# Patient Record
Sex: MALE | Race: WHITE | Employment: UNEMPLOYED | URBAN - METROPOLITAN AREA
[De-identification: names, ages, dates, MRNs, and addresses within clinical notes are randomized per-mention and may not be internally consistent; named-entity substitution may affect disease eponyms.]

---

## 2020-03-24 ENCOUNTER — HOSPITAL ENCOUNTER (INPATIENT)
Facility: CLINIC | Age: 55
LOS: 5 days | Discharge: HOME | End: 2020-03-30
Attending: EMERGENCY MEDICINE | Admitting: INTERNAL MEDICINE
Payer: MEDICAID

## 2020-03-24 DIAGNOSIS — J30.2 SEASONAL ALLERGIC RHINITIS, UNSPECIFIED TRIGGER: Primary | ICD-10-CM

## 2020-03-24 DIAGNOSIS — F10.930 ALCOHOL WITHDRAWAL SYNDROME WITHOUT COMPLICATION (H): ICD-10-CM

## 2020-03-24 LAB
BASOPHILS # BLD AUTO: 0 10E9/L (ref 0–0.2)
BASOPHILS NFR BLD AUTO: 0.3 %
DIFFERENTIAL METHOD BLD: ABNORMAL
EOSINOPHIL # BLD AUTO: 0.1 10E9/L (ref 0–0.7)
EOSINOPHIL NFR BLD AUTO: 1.2 %
ERYTHROCYTE [DISTWIDTH] IN BLOOD BY AUTOMATED COUNT: 17.3 % (ref 10–15)
HCT VFR BLD AUTO: 45.9 % (ref 40–53)
HGB BLD-MCNC: 17.2 G/DL (ref 13.3–17.7)
IMM GRANULOCYTES # BLD: 0 10E9/L (ref 0–0.4)
IMM GRANULOCYTES NFR BLD: 0.1 %
LYMPHOCYTES # BLD AUTO: 1.2 10E9/L (ref 0.8–5.3)
LYMPHOCYTES NFR BLD AUTO: 17.2 %
MCH RBC QN AUTO: 35 PG (ref 26.5–33)
MCHC RBC AUTO-ENTMCNC: 37.5 G/DL (ref 31.5–36.5)
MCV RBC AUTO: 93 FL (ref 78–100)
MONOCYTES # BLD AUTO: 0.7 10E9/L (ref 0–1.3)
MONOCYTES NFR BLD AUTO: 10.6 %
NEUTROPHILS # BLD AUTO: 4.9 10E9/L (ref 1.6–8.3)
NEUTROPHILS NFR BLD AUTO: 70.6 %
NRBC # BLD AUTO: 0 10*3/UL
NRBC BLD AUTO-RTO: 0 /100
PLATELET # BLD AUTO: 102 10E9/L (ref 150–450)
RBC # BLD AUTO: 4.92 10E12/L (ref 4.4–5.9)
WBC # BLD AUTO: 6.9 10E9/L (ref 4–11)

## 2020-03-24 PROCEDURE — 25800030 ZZH RX IP 258 OP 636: Performed by: EMERGENCY MEDICINE

## 2020-03-24 PROCEDURE — 83735 ASSAY OF MAGNESIUM: CPT | Performed by: EMERGENCY MEDICINE

## 2020-03-24 PROCEDURE — 96361 HYDRATE IV INFUSION ADD-ON: CPT

## 2020-03-24 PROCEDURE — 85025 COMPLETE CBC W/AUTO DIFF WBC: CPT | Performed by: EMERGENCY MEDICINE

## 2020-03-24 PROCEDURE — 80048 BASIC METABOLIC PNL TOTAL CA: CPT | Performed by: EMERGENCY MEDICINE

## 2020-03-24 PROCEDURE — 96374 THER/PROPH/DIAG INJ IV PUSH: CPT

## 2020-03-24 PROCEDURE — 25000128 H RX IP 250 OP 636: Performed by: EMERGENCY MEDICINE

## 2020-03-24 PROCEDURE — 84100 ASSAY OF PHOSPHORUS: CPT | Performed by: EMERGENCY MEDICINE

## 2020-03-24 PROCEDURE — 99285 EMERGENCY DEPT VISIT HI MDM: CPT

## 2020-03-24 RX ORDER — LORAZEPAM 2 MG/ML
2 INJECTION INTRAMUSCULAR ONCE
Status: COMPLETED | OUTPATIENT
Start: 2020-03-24 | End: 2020-03-24

## 2020-03-24 RX ADMIN — SODIUM CHLORIDE 1000 ML: 9 INJECTION, SOLUTION INTRAVENOUS at 23:49

## 2020-03-24 RX ADMIN — LORAZEPAM 2 MG: 2 INJECTION INTRAMUSCULAR; INTRAVENOUS at 23:51

## 2020-03-24 ASSESSMENT — ENCOUNTER SYMPTOMS
TREMORS: 1
HALLUCINATIONS: 1

## 2020-03-25 PROBLEM — F10.931 DELIRIUM TREMENS (H): Status: ACTIVE | Noted: 2020-03-25

## 2020-03-25 LAB
ANION GAP SERPL CALCULATED.3IONS-SCNC: 9 MMOL/L (ref 3–14)
BUN SERPL-MCNC: 10 MG/DL (ref 7–30)
CALCIUM SERPL-MCNC: 9.2 MG/DL (ref 8.5–10.1)
CHLORIDE SERPL-SCNC: 104 MMOL/L (ref 94–109)
CO2 SERPL-SCNC: 21 MMOL/L (ref 20–32)
CREAT SERPL-MCNC: 0.9 MG/DL (ref 0.66–1.25)
GFR SERPL CREATININE-BSD FRML MDRD: >90 ML/MIN/{1.73_M2}
GLUCOSE SERPL-MCNC: 144 MG/DL (ref 70–99)
LACTATE BLD-SCNC: 0.7 MMOL/L (ref 0.7–2)
MAGNESIUM SERPL-MCNC: 1.3 MG/DL (ref 1.6–2.3)
MAGNESIUM SERPL-MCNC: 2.2 MG/DL (ref 1.6–2.3)
PHOSPHATE SERPL-MCNC: 4.3 MG/DL (ref 2.5–4.5)
POTASSIUM SERPL-SCNC: 3.4 MMOL/L (ref 3.4–5.3)
POTASSIUM SERPL-SCNC: 3.5 MMOL/L (ref 3.4–5.3)
SODIUM SERPL-SCNC: 134 MMOL/L (ref 133–144)

## 2020-03-25 PROCEDURE — HZ2ZZZZ DETOXIFICATION SERVICES FOR SUBSTANCE ABUSE TREATMENT: ICD-10-PCS | Performed by: INTERNAL MEDICINE

## 2020-03-25 PROCEDURE — 25000128 H RX IP 250 OP 636: Performed by: INTERNAL MEDICINE

## 2020-03-25 PROCEDURE — 25000125 ZZHC RX 250: Performed by: EMERGENCY MEDICINE

## 2020-03-25 PROCEDURE — 25000132 ZZH RX MED GY IP 250 OP 250 PS 637: Performed by: HOSPITALIST

## 2020-03-25 PROCEDURE — 96366 THER/PROPH/DIAG IV INF ADDON: CPT

## 2020-03-25 PROCEDURE — 96376 TX/PRO/DX INJ SAME DRUG ADON: CPT

## 2020-03-25 PROCEDURE — 25000128 H RX IP 250 OP 636: Performed by: EMERGENCY MEDICINE

## 2020-03-25 PROCEDURE — 12000000 ZZH R&B MED SURG/OB

## 2020-03-25 PROCEDURE — 84132 ASSAY OF SERUM POTASSIUM: CPT | Performed by: INTERNAL MEDICINE

## 2020-03-25 PROCEDURE — 99223 1ST HOSP IP/OBS HIGH 75: CPT | Mod: AI | Performed by: INTERNAL MEDICINE

## 2020-03-25 PROCEDURE — 25800030 ZZH RX IP 258 OP 636: Performed by: EMERGENCY MEDICINE

## 2020-03-25 PROCEDURE — 83605 ASSAY OF LACTIC ACID: CPT | Performed by: HOSPITALIST

## 2020-03-25 PROCEDURE — 25000132 ZZH RX MED GY IP 250 OP 250 PS 637: Performed by: INTERNAL MEDICINE

## 2020-03-25 PROCEDURE — 36415 COLL VENOUS BLD VENIPUNCTURE: CPT | Performed by: INTERNAL MEDICINE

## 2020-03-25 PROCEDURE — 99207 ZZC NON-BILLABLE SERV PER CHARTING: CPT | Performed by: HOSPITALIST

## 2020-03-25 PROCEDURE — 83735 ASSAY OF MAGNESIUM: CPT | Performed by: INTERNAL MEDICINE

## 2020-03-25 RX ORDER — TETRAHYDROZOLINE HCL 0.05 %
1 DROPS OPHTHALMIC (EYE) 2 TIMES DAILY PRN
COMMUNITY

## 2020-03-25 RX ORDER — LANOLIN ALCOHOL/MO/W.PET/CERES
100 CREAM (GRAM) TOPICAL DAILY
Status: DISCONTINUED | OUTPATIENT
Start: 2020-03-26 | End: 2020-03-25

## 2020-03-25 RX ORDER — MULTIPLE VITAMINS W/ MINERALS TAB 9MG-400MCG
1 TAB ORAL DAILY
Status: DISCONTINUED | OUTPATIENT
Start: 2020-03-26 | End: 2020-03-25

## 2020-03-25 RX ORDER — LIDOCAINE 40 MG/G
CREAM TOPICAL
Status: DISCONTINUED | OUTPATIENT
Start: 2020-03-25 | End: 2020-03-30 | Stop reason: HOSPADM

## 2020-03-25 RX ORDER — AMOXICILLIN 250 MG
2 CAPSULE ORAL 2 TIMES DAILY PRN
Status: DISCONTINUED | OUTPATIENT
Start: 2020-03-25 | End: 2020-03-30 | Stop reason: HOSPADM

## 2020-03-25 RX ORDER — LANOLIN ALCOHOL/MO/W.PET/CERES
100 CREAM (GRAM) TOPICAL DAILY
Status: COMPLETED | OUTPATIENT
Start: 2020-03-25 | End: 2020-03-28

## 2020-03-25 RX ORDER — ACETAMINOPHEN 500 MG
1000 TABLET ORAL EVERY 4 HOURS PRN
COMMUNITY

## 2020-03-25 RX ORDER — NALOXONE HYDROCHLORIDE 0.4 MG/ML
.1-.4 INJECTION, SOLUTION INTRAMUSCULAR; INTRAVENOUS; SUBCUTANEOUS
Status: DISCONTINUED | OUTPATIENT
Start: 2020-03-25 | End: 2020-03-30 | Stop reason: HOSPADM

## 2020-03-25 RX ORDER — MAGNESIUM SULFATE HEPTAHYDRATE 40 MG/ML
2 INJECTION, SOLUTION INTRAVENOUS DAILY PRN
Status: DISCONTINUED | OUTPATIENT
Start: 2020-03-25 | End: 2020-03-30 | Stop reason: HOSPADM

## 2020-03-25 RX ORDER — ONDANSETRON 4 MG/1
4 TABLET, ORALLY DISINTEGRATING ORAL EVERY 6 HOURS PRN
Status: DISCONTINUED | OUTPATIENT
Start: 2020-03-25 | End: 2020-03-30 | Stop reason: HOSPADM

## 2020-03-25 RX ORDER — METOPROLOL TARTRATE 50 MG
50 TABLET ORAL 2 TIMES DAILY
Status: DISCONTINUED | OUTPATIENT
Start: 2020-03-25 | End: 2020-03-30 | Stop reason: HOSPADM

## 2020-03-25 RX ORDER — MULTIPLE VITAMINS W/ MINERALS TAB 9MG-400MCG
1 TAB ORAL DAILY
Status: DISCONTINUED | OUTPATIENT
Start: 2020-03-25 | End: 2020-03-30 | Stop reason: HOSPADM

## 2020-03-25 RX ORDER — METOPROLOL TARTRATE 50 MG
50 TABLET ORAL 2 TIMES DAILY
COMMUNITY

## 2020-03-25 RX ORDER — HALOPERIDOL 5 MG/ML
2 INJECTION INTRAMUSCULAR EVERY 6 HOURS PRN
Status: DISCONTINUED | OUTPATIENT
Start: 2020-03-25 | End: 2020-03-30 | Stop reason: HOSPADM

## 2020-03-25 RX ORDER — FOLIC ACID 1 MG/1
1 TABLET ORAL DAILY
Status: DISCONTINUED | OUTPATIENT
Start: 2020-03-26 | End: 2020-03-25

## 2020-03-25 RX ORDER — DIAZEPAM 10 MG/2ML
5-10 INJECTION, SOLUTION INTRAMUSCULAR; INTRAVENOUS EVERY 30 MIN PRN
Status: DISCONTINUED | OUTPATIENT
Start: 2020-03-25 | End: 2020-03-30 | Stop reason: HOSPADM

## 2020-03-25 RX ORDER — QUETIAPINE FUMARATE 25 MG/1
25-50 TABLET, FILM COATED ORAL EVERY 6 HOURS PRN
Status: DISCONTINUED | OUTPATIENT
Start: 2020-03-25 | End: 2020-03-30 | Stop reason: HOSPADM

## 2020-03-25 RX ORDER — POTASSIUM CHLORIDE 1500 MG/1
20-40 TABLET, EXTENDED RELEASE ORAL
Status: DISCONTINUED | OUTPATIENT
Start: 2020-03-25 | End: 2020-03-30 | Stop reason: HOSPADM

## 2020-03-25 RX ORDER — HALOPERIDOL 0.5 MG/1
2 TABLET ORAL EVERY 6 HOURS PRN
Status: DISCONTINUED | OUTPATIENT
Start: 2020-03-25 | End: 2020-03-25

## 2020-03-25 RX ORDER — DIAZEPAM 5 MG
10 TABLET ORAL EVERY 30 MIN PRN
Status: DISCONTINUED | OUTPATIENT
Start: 2020-03-25 | End: 2020-03-30 | Stop reason: HOSPADM

## 2020-03-25 RX ORDER — POLYETHYLENE GLYCOL 3350 17 G/17G
17 POWDER, FOR SOLUTION ORAL DAILY PRN
Status: DISCONTINUED | OUTPATIENT
Start: 2020-03-25 | End: 2020-03-30 | Stop reason: HOSPADM

## 2020-03-25 RX ORDER — POTASSIUM CHLORIDE 7.45 MG/ML
10 INJECTION INTRAVENOUS
Status: DISCONTINUED | OUTPATIENT
Start: 2020-03-25 | End: 2020-03-30 | Stop reason: HOSPADM

## 2020-03-25 RX ORDER — ACETAMINOPHEN 325 MG/1
650 TABLET ORAL EVERY 4 HOURS PRN
Status: DISCONTINUED | OUTPATIENT
Start: 2020-03-25 | End: 2020-03-30 | Stop reason: HOSPADM

## 2020-03-25 RX ORDER — AMOXICILLIN 250 MG
1 CAPSULE ORAL 2 TIMES DAILY PRN
Status: DISCONTINUED | OUTPATIENT
Start: 2020-03-25 | End: 2020-03-30 | Stop reason: HOSPADM

## 2020-03-25 RX ORDER — BISACODYL 10 MG
10 SUPPOSITORY, RECTAL RECTAL DAILY PRN
Status: DISCONTINUED | OUTPATIENT
Start: 2020-03-25 | End: 2020-03-30 | Stop reason: HOSPADM

## 2020-03-25 RX ORDER — ONDANSETRON 2 MG/ML
4 INJECTION INTRAMUSCULAR; INTRAVENOUS EVERY 6 HOURS PRN
Status: DISCONTINUED | OUTPATIENT
Start: 2020-03-25 | End: 2020-03-30 | Stop reason: HOSPADM

## 2020-03-25 RX ORDER — POTASSIUM CHLORIDE 1.5 G/1.58G
20-40 POWDER, FOR SOLUTION ORAL
Status: DISCONTINUED | OUTPATIENT
Start: 2020-03-25 | End: 2020-03-30 | Stop reason: HOSPADM

## 2020-03-25 RX ORDER — FOLIC ACID 1 MG/1
1 TABLET ORAL DAILY
Status: DISCONTINUED | OUTPATIENT
Start: 2020-03-25 | End: 2020-03-30 | Stop reason: HOSPADM

## 2020-03-25 RX ORDER — MAGNESIUM SULFATE HEPTAHYDRATE 40 MG/ML
4 INJECTION, SOLUTION INTRAVENOUS EVERY 4 HOURS PRN
Status: DISCONTINUED | OUTPATIENT
Start: 2020-03-25 | End: 2020-03-30 | Stop reason: HOSPADM

## 2020-03-25 RX ORDER — LORAZEPAM 2 MG/ML
4 INJECTION INTRAMUSCULAR ONCE
Status: COMPLETED | OUTPATIENT
Start: 2020-03-25 | End: 2020-03-25

## 2020-03-25 RX ORDER — POTASSIUM CHLORIDE 29.8 MG/ML
20 INJECTION INTRAVENOUS
Status: DISCONTINUED | OUTPATIENT
Start: 2020-03-25 | End: 2020-03-30 | Stop reason: HOSPADM

## 2020-03-25 RX ORDER — POTASSIUM CL/LIDO/0.9 % NACL 10MEQ/0.1L
10 INTRAVENOUS SOLUTION, PIGGYBACK (ML) INTRAVENOUS
Status: DISCONTINUED | OUTPATIENT
Start: 2020-03-25 | End: 2020-03-30 | Stop reason: HOSPADM

## 2020-03-25 RX ORDER — HALOPERIDOL 2 MG/1
2 TABLET ORAL EVERY 6 HOURS PRN
Status: DISCONTINUED | OUTPATIENT
Start: 2020-03-25 | End: 2020-03-30 | Stop reason: HOSPADM

## 2020-03-25 RX ADMIN — DIAZEPAM 10 MG: 5 TABLET ORAL at 17:20

## 2020-03-25 RX ADMIN — POTASSIUM CHLORIDE 20 MEQ: 1500 TABLET, EXTENDED RELEASE ORAL at 03:13

## 2020-03-25 RX ADMIN — DIAZEPAM 10 MG: 5 TABLET ORAL at 15:20

## 2020-03-25 RX ADMIN — DIAZEPAM 10 MG: 5 TABLET ORAL at 18:03

## 2020-03-25 RX ADMIN — Medication 100 MG: at 09:21

## 2020-03-25 RX ADMIN — DIAZEPAM 10 MG: 5 TABLET ORAL at 20:02

## 2020-03-25 RX ADMIN — FOLIC ACID 1 MG: 1 TABLET ORAL at 09:21

## 2020-03-25 RX ADMIN — POTASSIUM CHLORIDE 20 MEQ: 1500 TABLET, EXTENDED RELEASE ORAL at 10:04

## 2020-03-25 RX ADMIN — FOLIC ACID: 5 INJECTION, SOLUTION INTRAMUSCULAR; INTRAVENOUS; SUBCUTANEOUS at 00:39

## 2020-03-25 RX ADMIN — MULTIPLE VITAMINS W/ MINERALS TAB 1 TABLET: TAB at 09:21

## 2020-03-25 RX ADMIN — LORAZEPAM 4 MG: 2 INJECTION INTRAMUSCULAR; INTRAVENOUS at 00:39

## 2020-03-25 RX ADMIN — ACETAMINOPHEN 650 MG: 325 TABLET, FILM COATED ORAL at 15:41

## 2020-03-25 RX ADMIN — METOPROLOL TARTRATE 50 MG: 50 TABLET, FILM COATED ORAL at 12:18

## 2020-03-25 RX ADMIN — METOPROLOL TARTRATE 50 MG: 50 TABLET, FILM COATED ORAL at 20:02

## 2020-03-25 RX ADMIN — MAGNESIUM SULFATE IN WATER 4 G: 40 INJECTION, SOLUTION INTRAVENOUS at 04:19

## 2020-03-25 ASSESSMENT — ACTIVITIES OF DAILY LIVING (ADL)
ADLS_ACUITY_SCORE: 11
ADLS_ACUITY_SCORE: 11
ADLS_ACUITY_SCORE: 14

## 2020-03-25 NOTE — ED NOTES
"[facility]  ED Nurse Handoff Report    ED Chief complaint: Withdrawal (Last drink 0530 this AM.)      ED Diagnosis:   Final diagnoses:   Alcohol withdrawal syndrome without complication (H)       Code Status: Full Code    Allergies:   Allergies   Allergen Reactions     Penicillins        Patient Story: Pt presents with alcohol withdrawal. No hx of alcohol withdrawal seizure history. Last drink at 0530 today.  Focused Assessment:  Pt very tremulous. Having auditory and visual hallucinations. Visibly sweating. Reports he has a Rule 25 meeting on Thursday in order to get in for detox. Initially tachy in the 110-120s.    Treatments and/or interventions provided: 6mg zofran  Patient's response to treatments and/or interventions: Improvement in tremors and hallucinations and no longer tachycardic or sweating.    To be done/followed up on inpatient unit:  none pending    Does this patient have any cognitive concerns?: none    Activity level - Baseline/Home:  Independent  Activity Level - Current:   Independent    Patient's Preferred language: Data Unavailable   Needed?: No    Isolation: None  Infection: Not Applicable  Bariatric?: No    Vital Signs:   Vitals:    03/24/20 2341   BP: 133/72   Resp: 18   Temp: 98.7  F (37.1  C)   TempSrc: Oral   SpO2: 96%   Height: 1.778 m (5' 10\")       Cardiac Rhythm:     Was the PSS-3 completed:   Yes  What interventions are required if any?               Family Comments: None present  OBS brochure/video discussed/provided to patient/family: N/A              Name of person given brochure if not patient: N/A              Relationship to patient: N/A    For the majority of the shift this patient's behavior was Yellow.   Behavioral interventions performed were reassurance and information.    ED NURSE PHONE NUMBER: [phone]       "

## 2020-03-25 NOTE — PLAN OF CARE
A/ox4, anxious. C/o headache, prn tylenol given. VSS, ex hypertensive w/in parameters. CIWA score 10-auditory hallucinations, c/o someone saying his name in hallway causing pt to be very anxious. Pt also having headache and tremors. Diazepam given per orders. On reassessment, pt scored a 17 on CIWA scale for visual + auditory hallucinations-hearing dogs in hallway and someone saying his name, people dancing on a balconies across the street, plus headache, agitation, anxiety, tremors. Diazepam given per orders. On reassessment, score 10 on CIWA scale for anxiety, tremors, visual hallucinations. Diazepam given per orders. Up with SBA. Reg diet. Seizure precautions in place, no sz activity noted/reported. Plan for K+ recheck in AM. Discharge pending progress.

## 2020-03-25 NOTE — PROGRESS NOTES
RECEIVING UNIT ED HANDOFF REVIEW    ED Nurse Handoff Report was reviewed by: Ana Ramesh RN on March 25, 2020 at 1:16 AM

## 2020-03-25 NOTE — CONSULTS
3/25/20 chem dep consult acknowledged.  Patient has Medicaid so would need Rule 25 funding from [facility] for any substance use services.  I emailed unit  for [facility] and requested she provide to patient to complete and once completed return to me.  Will follow up once this is received.

## 2020-03-25 NOTE — PROGRESS NOTES
[facility]    Medicine Progress Note - Hospitalist Service       Date of Admission:  3/24/2020  Assessment & Plan   Leonel Ford is a very pleasant 54 year old male with alcohol abuse who was admitted on 3/24/2020 for delirium tremens and hallucinations associated with alcohol withdrawal.      Delirium Tremens with hallucinations   Alcohol Withdrawal  Alcohol use disorder  This is his 6th known ED/hospital presentation for alcohol intoxication or withdrawal within 4 months. He denies ever having alcohol treatment program enrollment in the past. He is willing to consider it now.   - Seizure precautions.  - CIWA with prn diazepam, prn IV/po haldol for hallucinations/agitation, prn seroquel.  - Continue thiamine, folate, MVI.  - Replace potassium, magnesium, phosphorus as needed.  - CD consultation pending.     Chronic thrombocytopenia  Liver cirrhosis   Hepatosplenomegaly  Secondary to longstanding alcohol abuse.  - Support abstinence or reduction in use as noted above.     Hypertension  Reports he takes a heart pill twice daily. Current medicine list indicates no PTA meds but chart review suggests he was started on metoprolol at a prior admission.  - Resume PTA metoprolol.        Diet: Combination Diet Regular Diet Adult    DVT Prophylaxis: Pneumatic Compression Devices  Puentes Catheter: not present  Code Status: Full Code      Disposition Plan   Expected discharge: 2 - 3 days.  Entered: Renny Alvarado MD 03/25/2020, 10:44 AM       The patient's care was discussed with the Bedside Nurse and Patient.    Renny Alvarado MD  Hospitalist Service  [facility]    ______________________________________________________________________    Interval History   Leonel Ford was seen this morning. Admitted overnight due to alcohol withdrawal. No new symptoms or concerns since admission.    Data reviewed today: I reviewed all medications, new labs and imaging results over the last 24 hours.  I personally reviewed no images or EKG's today.    Physical Exam   Vital Signs: Temp: 97.9  F (36.6  C) Temp src: Axillary BP: (!) 153/102 Pulse: 92 Heart Rate: 103 Resp: 28 SpO2: 96 % O2 Device: None (Room air)    Weight: 0 lbs 0 oz  Constitutional: awake, alert, cooperative, no apparent distress  Respiratory: clear to auscultation bilaterally, no crackles or wheezing  Cardiovascular: regular rate and rhythm, normal S1 and S2, no murmur noted  GI: normal bowel sounds, soft, obese, non-distended, non-tender  Skin: warm, dry  Musculoskeletal: no lower extremity pitting edema present  Neurologic: awake, alert, oriented to name, place and time, mild tremor    Data   Recent Labs   Lab 03/25/20  0918 03/24/20  2347   WBC  --  6.9   HGB  --  17.2   MCV  --  93   PLT  --  102*   NA  --  134   POTASSIUM 3.5 3.4   CHLORIDE  --  104   CO2  --  21   BUN  --  10   CR  --  0.90   ANIONGAP  --  9   ROXY  --  9.2   GLC  --  144*     No results found for this or any previous visit (from the past 24 hour(s)).  Medications       folic acid  1 mg Oral Daily     metoprolol tartrate  50 mg Oral BID     multivitamin w/minerals  1 tablet Oral Daily     sodium chloride (PF)  3 mL Intracatheter Q8H     vitamin B1  100 mg Oral Daily

## 2020-03-25 NOTE — PLAN OF CARE
A&Ox4. VSS on RA. CIWA scale scored a 5 and 3. Lung sounds clear, BS present. Denies pain. Tele SR with BBB. Seizure precaution in place. Abnormal labs Phos 4.3, Mag 1.3 Replaced, K+ 3.4 replaced. PIV. Has a CD consult.  Discharge to be determined.

## 2020-03-25 NOTE — ED PROVIDER NOTES
"  History   Chief Complaint:  Withdrawal      HPI   Leonel Ford is a 54 year old male with history of hypertension who presents for evaluation of alcohol withdrawal. The patient states for the past 3 weeks he has been drinking four 1.75 L of whiskey per week. This morning between 0530 and 0600 he had his last drink and began to develop tremors. He noted his symptoms were similar in presentation to her previous alcohol withdrawal, prompting his presentation.    Here, the patient states he does not have a history of alcohol withdrawal seizures. He also endorses visual hallucinations. He notes he has been to detox once in the past. He denies any other symptoms prompting his presentation.     Allergies:  Penicillins    Medications:    The patient is not currently taking any prescribed medications.     Past Medical History:    Hypertension    Past Surgical History:    History reviewed. No pertinent past surgical history.     Family History:    The patient denies any relevant family medical history.     Social History:  The patient was unaccompanied to the ED.  Smoking Status: Never  Smokeless Tobacco: Never  Alcohol Use: Yes  Drug Use: No       Review of Systems   Neurological: Positive for tremors.   Psychiatric/Behavioral: Positive for hallucinations.   All other systems reviewed and are negative.    Physical Exam     Patient Vitals for the past 24 hrs:   BP Temp Temp src Pulse Heart Rate Resp SpO2 Height   03/25/20 0100 (!) 138/99 -- -- 92 -- -- 95 % --   03/25/20 0001 (!) 158/109 -- -- 105 -- -- 96 % --   03/24/20 2353 (!) 162/101 -- -- 109 -- -- 96 % --   03/24/20 2341 133/72 98.7  F (37.1  C) Oral -- 110 18 96 % 1.778 m (5' 10\")     Physical Exam  Vitals: reviewed by me  General: Pt seen on [facility], pleasant, cooperative, and alert to conversation, mildly ill-appearing, diaphoretic.  Eyes: Tracking well, clear conjunctiva BL  ENT: MMM, midline trachea.   Lymph: No lymphadenopathy or masses noted  Lungs:   " No tachypnea, no accessory muscle use. No respiratory distress.   CV: Rate as above, regular rhythm.    MSK: no peripheral edema or joint effusion.  No evidence of trauma  Skin: No rash, normal turgor and temperature  Neuro: Clear speech and no facial droop.  Tremulous in all extremities, somewhat anxious appearing.  Psych: Not RIS, no e/o AH/VH denies suicidality.    Emergency Department Course   Laboratory:  Laboratory findings were communicated with the patient and Admitting MD who voiced understanding of the findings.    CBC:  (L) o/w WNL (WBC 6.9, HGB 17.2)   BMP: Glucose 144 (H) o/w WNL (Creatinine 0.90)  Magnesium: 1.3 (L)     Interventions:  2349 0.9% NaCl bolus 1000 mL IV   2351 Ativan 2 mg IV  0039 Ativan 4 mg IV  0039 Sodium chloride 0.9% 1000 mL with Infuvite adult 10 mL, thiamine 100 mg, folic acid 1 mg infusion IV    Emergency Department Course:  Past medical records, nursing notes, and vitals reviewed.  IV was inserted and blood was drawn for laboratory testing, results above.     (2337)   I performed an exam of the patient as documented above. History obtained from patient.     (0010)   I rechecked the patient.    (0010)   Hospitalist paged.      (0020)   I spoke with Dr. Vasques of the Hospitalist service regarding patient's presentation, findings, and plan of care.     Findings and plan explained to the Patient who consents to admission. Discussed the patient with Dr. Vasques, who will admit the patient to a SUDS bed for further monitoring, evaluation, and treatment. I personally reviewed the laboratory results with the Patient and answered all related questions prior to admission.     Impression & Plan   Medical Decision Making:  Leonel Ford is a very pleasant 54 year old male who presents to the emergency room for what appears to be alcohol withdrawal. He has required 6 of Ativan in the first 2 hours of being here, and I do think he needs to be admitted to the hospital. He is doing quite  well overall, labs unremarkable, will plan for IV fluid resuscitation as well as a banana bag. I spoke to Dr. Vasques who has kindly agreed to accept the care of the patient to the medical floor. Patient is okay with plan, no suicidality.      Critical care time 35 minutes.    Diagnosis:    ICD-10-CM    1. Alcohol withdrawal syndrome without complication (H)  F10.230      Disposition:  Admitted to a SUDS bed under the care of Dr. Vasques.     Scribe Disclosure:  I, Ugo Lopez, am serving as a scribe at 11:35 PM on 3/24/2020 to document services personally performed by Amado Ortiz MD based on my observations and the provider's statements to me.  March 24, 2020   [facility] EMERGENCY DEPARTMENT       Amado Ortiz MD  03/25/20 0577

## 2020-03-25 NOTE — PHARMACY-ADMISSION MEDICATION HISTORY
Pharmacy Medication History  Admission medication history interview status for the 3/24/2020  admission is complete. See EPIC admission navigator for prior to admission medications     Medication history sources: Patient  Medication history source reliability: Good - Verified metoprolol with patient's bottle from home.   Adherence assessment: Good      Medication reconciliation completed by provider prior to medication history? Yes    Time spent in this activity: 10 minutes      Prior to Admission medications    Medication Sig Last Dose Taking? Auth Provider   acetaminophen (TYLENOL) 500 MG tablet Take 1,000 mg by mouth every 4 hours as needed for mild pain prn med Yes Unknown, Entered By History   metoprolol tartrate (LOPRESSOR) 50 MG tablet Take 50 mg by mouth 2 times daily Past Week at Unknown time Yes Unknown, Entered By History   tetrahydrozoline (VISINE) 0.05 % ophthalmic solution Place 1 drop into both eyes 2 times daily as needed prn med Yes Unknown, Entered By History

## 2020-03-25 NOTE — PROVIDER NOTIFICATION
MD Notification    Notified Person: MD    Notified Person Name: Jeanette Vasques     Notification Date/Time:3/25/2020 0308    Notification Interaction:Amcom with call back    Purpose of Notification: would you like him on tele if replacing electrolytes?       Orders Received: placed order for Tele    Comments:

## 2020-03-25 NOTE — PLAN OF CARE
Summary: Alcohol Withdrawal    Date/Time 3/25/2020 6506-5580    Service: Medical  Behavior/Aggression tool color: Green  Diagnosis: ETOH withdrawal  POD#: NA  Mental Status: A&Ox3 Disoriented to time  Activity/dangle: SBA  Diet: Regular  Pain: denies   Puentes/Voiding: voiding  Tele/Restraints/Iso: Tele  LDA: RA, PIV  D/C Date: TBD  Other Info: A&Ox3 Disoriented to time. BP elevated 140s/100s. Has home metoprolol in bag, physician started metoprolol. Meds kept in pt closet. All other VSS on RA. CIWA scale scored a 5 throughout the shift. Lung sounds clear, BS present. Denies pain. Seizure precaution in place. Magnesium and potassium replaced overnight. M.2 and potassium 3.5 20 mEq given with re-check in am. PIV. Discharge to be determined.

## 2020-03-25 NOTE — ED TRIAGE NOTES
Patient drinks four 1.75L bottles of whiskey each week, last drink was this morning at 5:30 am. No patient complains of withdrawal symptoms, no history of withdrawal seizures.

## 2020-03-25 NOTE — PROGRESS NOTES
SPIRITUAL HEALTH SERVICES Progress Note  [facility]    Initial visit per pt request.  Pt talked about his hx of drinking and his intention and need to pursue inpatient treatment.  Pt acknowledges that he can't recover on his own, and said it's hard to go more than a few days without drinking when he's not in a controlled environment.  SH affirmed pt's honest acknowledgement of his need for support.  SH remains available for f/u per pt need/request.                                                                                                                                             Adrian Pringle M.Div., [facility]  Staff   Pager [phone]

## 2020-03-25 NOTE — H&P
[facility]  History and Physical  Hospitalist       Date of Admission:  3/24/2020    Assessment & Plan   Leonel Ford is a very pleasant 54 year old male with alcohol abuse who was admitted on 3/24/2020 for delirium tremens and hallucinations associated with alcohol withdrawal.     Delirium Tremens with hallucinations   Alcohol Withdrawal  This is his 6th known ED/hospital presentation for alcohol intoxication or withdrawal within 4 months. He denies ever having alcohol treatment program enrollment in the past. He is willing to consider it now.   -admit to medicine unit  -seizure precautions  -CIWA with prn diazepam, prn IV/po haldol for hallucinations/agitation, prn seroquel  -thiamine, folate, MVI  -replace potassium, magnesium, phosphorus as needed  -CD consultation    Chronic thrombocytopenia  Liver cirrhosis   Hepatosplenomegaly  Secondary to longstanding alcohol abuse.  -support abstinence or reduction in use    Hypertension  Reports he takes a heart pill twice daily. Current medicine list indicates no PTA meds but chart review suggests he was started on metoprolol at a prior admission.  -resume PTA metoprolol once dose reconciled     DVT prophylaxis: Pneumatic Compression Devices and Ambulate every shift  Code Status: Full    Disposition: Expected discharge in 2-3 days.    Jeanette Vasques MD  Text Page    ~~~~~~~~~~~~~~~~~~~~~~~~~~~~~~~~~~~~~~~~~~~~~~~~~~~~~  Primary Care Physician   No primary care provider on file.    Chief Complaint   Hallucinations, tremors    History is obtained from the patient and medical records.    History of Present Illness   Leonel Ford is a very pleasant 54 year old male with alcohol abuse, hypertension who presents with tremors and hallucinations. Typically drinks 1.75 L daily of whiskey. This is the 6th time in 4 months that he has been seen in the ED or admitted to the hospital for alcohol-related problems. Denies he has ever been in a treatment program  "before but has explored the possibility of a Rule 25 for treatment funding. Reports last drink of alcohol was ~ 5:30 AM on 3/24 (Tuesday). Having visual hallucinations and appears hypervigilant. Denies chest pain, shortness of breath, cough, abdominal pain. Reports his only medication is a \"heart pill\" that he takes twice a day. Denies suicidal ideation.     Case reviewed with Dr. Ortiz from the Emergency Department. Patient was tachycardic to 110, hypertensive 162/101, afebrile, normal O2 satsLabs reviewed. Received 6 mg IV ativan total in ED, thiamine, folate, 1 L IVF.     Past Medical History    I have reviewed this patient's medical history and updated it with pertinent information if needed.   Hypertension  Alcohol Abuse    Past Surgical History   I have reviewed this patient's surgical history and updated it with pertinent information if needed.  Denies any prior surgeries.    Prior to Admission Medications   None     Allergies   Allergies   Allergen Reactions     Penicillins      Social History   I have reviewed this patient's social history and updated it with pertinent information if needed. Leonel Fodr  is a nonsmoker. He has remote history of drug use and current daily alcohol use. Lives in [facility].     Family History   I have reviewed this patient's family history and updated it with pertinent information if needed.   Denies family history of alcohol abuse.    Review of Systems   The 10 point Review of Systems is negative other than noted in the HPI or here.    Physical Exam   Temp: 98.7  F (37.1  C) Temp src: Oral BP: 133/72   Heart Rate: 110 Resp: 18 SpO2: 96 %      Vital Signs with Ranges  Temp:  [98.7  F (37.1  C)] 98.7  F (37.1  C)  Heart Rate:  [110] 110  Resp:  [18] 18  BP: (133)/(72) 133/72  SpO2:  [96 %] 96 %  0 lbs 0 oz    Constitutional: Alert, NAD, pleasant and interactive  Eyes: PERRL, sclerae anicteric  HEENT: mmm, atraumatic, +tongue fasciculations   Respiratory: Lungs CTAB, no " wheezes or crackles  Cardiovascular: tachycardic, regular, no murmurs  no LE edema  GI: obese, soft, non-tender, nondistended  Skin/Integument: diaphoretic, warm, dry, no acute rashes  Musc: MAURICE, normal limb strength x 4  Neuro: AOx3, no focal deficits, + tremors of outstretched hands  Psych: mildly anxious, +hallucinations, appears to be hypervigilant     Data   Data reviewed today:  I personally reviewed the EKG tracing showing sinus tachycardia.  Recent Labs   Lab 03/24/20  2347   WBC 6.9   HGB 17.2   MCV 93   *      POTASSIUM 3.4   CHLORIDE 104   CO2 21   BUN 10   CR 0.90   ANIONGAP 9   ROXY 9.2   *       Imaging:  No results found for this or any previous visit (from the past 24 hour(s)).

## 2020-03-26 LAB
ANION GAP SERPL CALCULATED.3IONS-SCNC: 8 MMOL/L (ref 3–14)
BUN SERPL-MCNC: 10 MG/DL (ref 7–30)
CALCIUM SERPL-MCNC: 8.3 MG/DL (ref 8.5–10.1)
CHLORIDE SERPL-SCNC: 105 MMOL/L (ref 94–109)
CO2 SERPL-SCNC: 21 MMOL/L (ref 20–32)
CREAT SERPL-MCNC: 0.82 MG/DL (ref 0.66–1.25)
GFR SERPL CREATININE-BSD FRML MDRD: >90 ML/MIN/{1.73_M2}
GLUCOSE BLDC GLUCOMTR-MCNC: 95 MG/DL (ref 70–99)
GLUCOSE SERPL-MCNC: 91 MG/DL (ref 70–99)
MAGNESIUM SERPL-MCNC: 1.9 MG/DL (ref 1.6–2.3)
PHOSPHATE SERPL-MCNC: 3.2 MG/DL (ref 2.5–4.5)
POTASSIUM SERPL-SCNC: 3.3 MMOL/L (ref 3.4–5.3)
POTASSIUM SERPL-SCNC: 4 MMOL/L (ref 3.4–5.3)
SODIUM SERPL-SCNC: 134 MMOL/L (ref 133–144)

## 2020-03-26 PROCEDURE — 84100 ASSAY OF PHOSPHORUS: CPT | Performed by: HOSPITALIST

## 2020-03-26 PROCEDURE — 84132 ASSAY OF SERUM POTASSIUM: CPT | Performed by: HOSPITALIST

## 2020-03-26 PROCEDURE — 36415 COLL VENOUS BLD VENIPUNCTURE: CPT | Performed by: HOSPITALIST

## 2020-03-26 PROCEDURE — 80048 BASIC METABOLIC PNL TOTAL CA: CPT | Performed by: HOSPITALIST

## 2020-03-26 PROCEDURE — 00000146 ZZHCL STATISTIC GLUCOSE BY METER IP

## 2020-03-26 PROCEDURE — 25000132 ZZH RX MED GY IP 250 OP 250 PS 637: Performed by: INTERNAL MEDICINE

## 2020-03-26 PROCEDURE — 12000000 ZZH R&B MED SURG/OB

## 2020-03-26 PROCEDURE — 25000128 H RX IP 250 OP 636: Performed by: INTERNAL MEDICINE

## 2020-03-26 PROCEDURE — 99232 SBSQ HOSP IP/OBS MODERATE 35: CPT | Performed by: HOSPITALIST

## 2020-03-26 PROCEDURE — 25000132 ZZH RX MED GY IP 250 OP 250 PS 637: Performed by: HOSPITALIST

## 2020-03-26 PROCEDURE — 83735 ASSAY OF MAGNESIUM: CPT | Performed by: HOSPITALIST

## 2020-03-26 RX ADMIN — METOPROLOL TARTRATE 50 MG: 50 TABLET, FILM COATED ORAL at 08:41

## 2020-03-26 RX ADMIN — ACETAMINOPHEN 650 MG: 325 TABLET, FILM COATED ORAL at 19:55

## 2020-03-26 RX ADMIN — DIAZEPAM 10 MG: 5 TABLET ORAL at 04:50

## 2020-03-26 RX ADMIN — POTASSIUM CHLORIDE 20 MEQ: 1500 TABLET, EXTENDED RELEASE ORAL at 18:49

## 2020-03-26 RX ADMIN — POTASSIUM CHLORIDE 40 MEQ: 1500 TABLET, EXTENDED RELEASE ORAL at 08:41

## 2020-03-26 RX ADMIN — MAGNESIUM SULFATE HEPTAHYDRATE 2 G: 40 INJECTION, SOLUTION INTRAVENOUS at 08:42

## 2020-03-26 RX ADMIN — POTASSIUM CHLORIDE 20 MEQ: 1500 TABLET, EXTENDED RELEASE ORAL at 10:58

## 2020-03-26 RX ADMIN — MULTIPLE VITAMINS W/ MINERALS TAB 1 TABLET: TAB at 08:41

## 2020-03-26 RX ADMIN — ACETAMINOPHEN 650 MG: 325 TABLET, FILM COATED ORAL at 05:54

## 2020-03-26 RX ADMIN — Medication 100 MG: at 08:41

## 2020-03-26 RX ADMIN — DIAZEPAM 10 MG: 5 TABLET ORAL at 05:51

## 2020-03-26 RX ADMIN — FOLIC ACID 1 MG: 1 TABLET ORAL at 08:41

## 2020-03-26 RX ADMIN — METOPROLOL TARTRATE 50 MG: 50 TABLET, FILM COATED ORAL at 20:00

## 2020-03-26 RX ADMIN — DIAZEPAM 10 MG: 5 TABLET ORAL at 19:55

## 2020-03-26 RX ADMIN — DIAZEPAM 10 MG: 5 TABLET ORAL at 10:58

## 2020-03-26 ASSESSMENT — ACTIVITIES OF DAILY LIVING (ADL)
ADLS_ACUITY_SCORE: 15

## 2020-03-26 NOTE — PLAN OF CARE
A/ox4, anxious. VSS, ex hypertensive w/in parameters. CIWA score 6, 16,18, 5, 20 mg of Valium given. CIWA scale for anxiety, tremors, visual hallucinations and headache.  Tylenol given for headache. Up with SBA. Reg diet. Seizure precautions in place, no sz activity noted/reported. Discharge pending progress.

## 2020-03-26 NOTE — CONSULTS
"Care Transition Initial Assessment - SW     Met with: chart review    Active Problems:    Delirium tremens (H)       DATA  Lives With: alone      Quality of Family Relationships: unable to assess  Description of Support System: Involved  Who is your support system?: Other (specify)(friend)  Support Assessment: Adequate social supports.   Identified issues/concerns regarding health management: SW following for discharge needs  Quality of Family Relationships: unable to assess     ASSESSMENT  Cognitive Status: Alert and oriented X4-Anxious  Concerns to be addressed: Patient is a 54 year old male who was admitted to the hospital for alcohol withdrawal. Prior to hospitalization patient was living alone at home where he reports he was drinking a 1.75 L of Whisky per week. Patient states prior to his admissions that he had not drank since 0530 that morning and started experiencing tremors. Patient has not been to CD treatment in the past but has had 6 recent (known)ED visits for alcohol intoxication. Patient reports that he is willing to consider CD treatment and shared with  that \"he can't recover on his own, and said it's hard to go more than a few days without drinking when he's not in a controlled environment.\" CD Counselor is following patient and requested SW provide patient with forms to complete for Rule 25 in [facility]. SW will provide to patient to complete and once completed SW will send to CD counselor. Patient is currently scoring on CIWA.     ADDENDUM  I: SW assisted patient in completing forms that were provided by CD counselor. SW emailed completed documents to CD Counsleor.  CD Counselor will meet with patient on 3/27.     PLAN  Financial costs for the patient includes   Patient given options and choices for discharge   Patient/family is agreeable to the plan?  YES  Transportation/person available to transport on day of discharge  is  and have they been notified/set up   Patient Goals and " Preferences: TBD  Patient anticipates discharging to:  TBD    REI Campbell, [facility]  [phone]  [facility]

## 2020-03-26 NOTE — PROGRESS NOTES
[facility]    Medicine Progress Note - Hospitalist Service       Date of Admission:  3/24/2020  Assessment & Plan   Leonel Ford is a very pleasant 54 year old male with alcohol abuse who was admitted on 3/24/2020 for delirium tremens and hallucinations associated with alcohol withdrawal.      Delirium Tremens with hallucinations   Alcohol Withdrawal  Alcohol use disorder  This is his 6th known ED/hospital presentation for alcohol intoxication or withdrawal within 4 months. He denies ever having alcohol treatment program enrollment in the past. He is willing to consider it now.   - Seizure precautions.  - CIWA with prn diazepam, prn IV/po haldol for hallucinations/agitation, prn seroquel.  - Continue thiamine, folate, MVI.  - Replace potassium, magnesium, phosphorus as needed.  - CD consultation pending.  - Continues to have symptoms of withdrawal requiring PRN diazepam, continue inpatient care.     Chronic thrombocytopenia  Liver cirrhosis   Hepatosplenomegaly  Secondary to longstanding alcohol abuse.  - Support abstinence or reduction in use as noted above.     Hypertension  Reports he takes a heart pill twice daily. Current medicine list indicates no PTA meds but chart review suggests he was started on metoprolol at a prior admission.  - Continue PTA metoprolol.       Diet: Combination Diet Regular Diet Adult    DVT Prophylaxis: Pneumatic Compression Devices  Puentes Catheter: not present  Code Status: Full Code      Disposition Plan   Expected discharge: 2-3 days pending resolution of alcohol withdrawal symptoms and safe discharge plan  Entered: Renny Alvarado MD 03/26/2020, 11:23 AM       The patient's care was discussed with the Bedside Nurse and Patient.    Renny Alvarado MD  Hospitalist Service  [facility]    ______________________________________________________________________    Interval History   Leonel Ford feels about the same today. Continues to have a  tremor. Continues to have auditory and visual hallucinations (hears people screaming and bad guys talking to him, saw a dwarf doing a jig on a windowsill). Occasional nausea. Denies fevers, chest pain, shortness of breath. Occasional abdominal discomfort when he is nauseous.    Data reviewed today: I reviewed all medications, new labs and imaging results over the last 24 hours. I personally reviewed no images or EKG's today.    Physical Exam   Vital Signs: Temp: 98.3  F (36.8  C) Temp src: Oral BP: (!) 157/99 Pulse: 80 Heart Rate: 95 Resp: 20 SpO2: 93 % O2 Device: None (Room air)    Weight: 0 lbs 0 oz  Constitutional: awake, alert, cooperative, no apparent distress  Respiratory: clear to auscultation bilaterally, no crackles or wheezing  Cardiovascular: regular rate and rhythm, normal S1 and S2, no murmur noted  GI: normal bowel sounds, soft, non-distended, non-tender  Skin: warm, dry  Musculoskeletal: no lower extremity pitting edema present  Neurologic: awake, alert, oriented to name, place and time, mild tremor    Data   Recent Labs   Lab 03/26/20  0615 03/25/20  0918 03/24/20  2347   WBC  --   --  6.9   HGB  --   --  17.2   MCV  --   --  93   PLT  --   --  102*     --  134   POTASSIUM 3.3* 3.5 3.4   CHLORIDE 105  --  104   CO2 21  --  21   BUN 10  --  10   CR 0.82  --  0.90   ANIONGAP 8  --  9   ROXY 8.3*  --  9.2   GLC 91  --  144*     Medications       folic acid  1 mg Oral Daily     metoprolol tartrate  50 mg Oral BID     multivitamin w/minerals  1 tablet Oral Daily     sodium chloride (PF)  3 mL Intracatheter Q8H     vitamin B1  100 mg Oral Daily

## 2020-03-26 NOTE — PLAN OF CARE
VSS, up with SBA, voiding in b.r, denied any pain or head ace, on seizure precaution, no seizure activity noted this shift, CIWA, 6,14,6 and 7, one dose of 10 mg of valium given for score of 14, magnesium and potassium replaced, discharge pending, will continue to monitor.

## 2020-03-27 LAB
ALBUMIN SERPL-MCNC: 3.1 G/DL (ref 3.4–5)
ALP SERPL-CCNC: 70 U/L (ref 40–150)
ALT SERPL W P-5'-P-CCNC: 28 U/L (ref 0–70)
ANION GAP SERPL CALCULATED.3IONS-SCNC: 5 MMOL/L (ref 3–14)
AST SERPL W P-5'-P-CCNC: 37 U/L (ref 0–45)
BILIRUB SERPL-MCNC: 1.5 MG/DL (ref 0.2–1.3)
BUN SERPL-MCNC: 10 MG/DL (ref 7–30)
CALCIUM SERPL-MCNC: 8.6 MG/DL (ref 8.5–10.1)
CHLORIDE SERPL-SCNC: 103 MMOL/L (ref 94–109)
CO2 SERPL-SCNC: 26 MMOL/L (ref 20–32)
CREAT SERPL-MCNC: 0.91 MG/DL (ref 0.66–1.25)
ERYTHROCYTE [DISTWIDTH] IN BLOOD BY AUTOMATED COUNT: 17.9 % (ref 10–15)
GFR SERPL CREATININE-BSD FRML MDRD: >90 ML/MIN/{1.73_M2}
GLUCOSE SERPL-MCNC: 83 MG/DL (ref 70–99)
HCT VFR BLD AUTO: 44.4 % (ref 40–53)
HGB BLD-MCNC: 15.8 G/DL (ref 13.3–17.7)
LIPASE SERPL-CCNC: 152 U/L (ref 73–393)
MAGNESIUM SERPL-MCNC: 1.8 MG/DL (ref 1.6–2.3)
MCH RBC QN AUTO: 34.6 PG (ref 26.5–33)
MCHC RBC AUTO-ENTMCNC: 35.6 G/DL (ref 31.5–36.5)
MCV RBC AUTO: 97 FL (ref 78–100)
PLATELET # BLD AUTO: 74 10E9/L (ref 150–450)
POTASSIUM SERPL-SCNC: 3.8 MMOL/L (ref 3.4–5.3)
PROT SERPL-MCNC: 8.2 G/DL (ref 6.8–8.8)
RBC # BLD AUTO: 4.57 10E12/L (ref 4.4–5.9)
SODIUM SERPL-SCNC: 134 MMOL/L (ref 133–144)
WBC # BLD AUTO: 5.8 10E9/L (ref 4–11)

## 2020-03-27 PROCEDURE — 83690 ASSAY OF LIPASE: CPT | Performed by: HOSPITALIST

## 2020-03-27 PROCEDURE — 25000132 ZZH RX MED GY IP 250 OP 250 PS 637: Performed by: HOSPITALIST

## 2020-03-27 PROCEDURE — 36415 COLL VENOUS BLD VENIPUNCTURE: CPT | Performed by: HOSPITALIST

## 2020-03-27 PROCEDURE — 12000000 ZZH R&B MED SURG/OB

## 2020-03-27 PROCEDURE — 83735 ASSAY OF MAGNESIUM: CPT | Performed by: HOSPITALIST

## 2020-03-27 PROCEDURE — 80053 COMPREHEN METABOLIC PANEL: CPT | Performed by: HOSPITALIST

## 2020-03-27 PROCEDURE — 85027 COMPLETE CBC AUTOMATED: CPT | Performed by: HOSPITALIST

## 2020-03-27 PROCEDURE — 25000128 H RX IP 250 OP 636: Performed by: INTERNAL MEDICINE

## 2020-03-27 PROCEDURE — 25000132 ZZH RX MED GY IP 250 OP 250 PS 637: Performed by: INTERNAL MEDICINE

## 2020-03-27 PROCEDURE — 99232 SBSQ HOSP IP/OBS MODERATE 35: CPT | Performed by: HOSPITALIST

## 2020-03-27 RX ORDER — LORATADINE 10 MG/1
10 TABLET ORAL DAILY
Status: DISCONTINUED | OUTPATIENT
Start: 2020-03-27 | End: 2020-03-30 | Stop reason: HOSPADM

## 2020-03-27 RX ADMIN — METOPROLOL TARTRATE 50 MG: 50 TABLET, FILM COATED ORAL at 08:08

## 2020-03-27 RX ADMIN — MAGNESIUM SULFATE HEPTAHYDRATE 2 G: 40 INJECTION, SOLUTION INTRAVENOUS at 17:35

## 2020-03-27 RX ADMIN — MULTIPLE VITAMINS W/ MINERALS TAB 1 TABLET: TAB at 08:08

## 2020-03-27 RX ADMIN — LORATADINE 10 MG: 10 TABLET ORAL at 10:18

## 2020-03-27 RX ADMIN — FOLIC ACID 1 MG: 1 TABLET ORAL at 08:08

## 2020-03-27 RX ADMIN — POTASSIUM CHLORIDE 20 MEQ: 1500 TABLET, EXTENDED RELEASE ORAL at 17:34

## 2020-03-27 RX ADMIN — ACETAMINOPHEN 650 MG: 325 TABLET, FILM COATED ORAL at 13:11

## 2020-03-27 RX ADMIN — METOPROLOL TARTRATE 50 MG: 50 TABLET, FILM COATED ORAL at 21:25

## 2020-03-27 RX ADMIN — Medication 100 MG: at 08:08

## 2020-03-27 ASSESSMENT — ACTIVITIES OF DAILY LIVING (ADL)
ADLS_ACUITY_SCORE: 15

## 2020-03-27 NOTE — PROGRESS NOTES
[facility]    Medicine Progress Note - Hospitalist Service       Date of Admission:  3/24/2020  Assessment & Plan   Leonel Ford is a very pleasant 54 year old male with alcohol abuse who was admitted on 3/24/2020 for delirium tremens and hallucinations associated with alcohol withdrawal.      Delirium Tremens with hallucinations   Alcohol Withdrawal  Alcohol use disorder  This is his 6th known ED/hospital presentation for alcohol intoxication or withdrawal within 4 months. He denies ever having alcohol treatment program enrollment in the past. He is willing to consider it now.   - Seizure precautions.  - CIWA with prn diazepam, prn IV/po haldol for hallucinations/agitation, prn seroquel.  - Continue thiamine, folate, MVI.  - Replace potassium, magnesium, phosphorus as needed.  - CD consultation pending.  - Continues to have symptoms of withdrawal requiring PRN diazepam, continue inpatient care.     Chronic thrombocytopenia  Liver cirrhosis   Hepatosplenomegaly  Secondary to longstanding alcohol abuse.  - Support abstinence or reduction in use as noted above.     Hypertension  Reports he takes a heart pill twice daily. Current medicine list indicates no PTA meds but chart review suggests he was started on metoprolol at a prior admission.  - Continue PTA metoprolol.  - Blood pressure mildly elevated.       Diet: Combination Diet Regular Diet Adult    DVT Prophylaxis: Pneumatic Compression Devices  Puentes Catheter: not present  Code Status: Full Code      Disposition Plan   Expected discharge: 2-3 days pending improvement in withdrawal symptoms and chemical dependency treatment plan  Entered: Renny Alvarado MD 03/27/2020, 10:23 AM       The patient's care was discussed with the Bedside Nurse and Patient.    Renny Alvarado MD  Hospitalist Service  [facility]    ______________________________________________________________________    Interval History   Leonel Ford feels about  the same today. Still having auditory and visual hallucinations, seem to be worse at night. Sees little people dancing on his windowsill and red and blue stars coming toward the window. Hears random people talking and yelling when no one else does. Continues to have a tremor. Feels somewhat anxious. Abdominal discomfort and nausea improved. Denies fevers, chest pain, shortness of breath.    Data reviewed today: I reviewed all medications, new labs and imaging results over the last 24 hours. I personally reviewed no images or EKG's today.    Physical Exam   Vital Signs: Temp: 97.7  F (36.5  C) Temp src: Axillary BP: (!) 146/93 Pulse: 89 Heart Rate: 86 Resp: 16 SpO2: 96 % O2 Device: None (Room air)    Weight: 0 lbs 0 oz  Constitutional: awake, alert, cooperative, no apparent distress  Respiratory: clear to auscultation bilaterally, no crackles or wheezing  Cardiovascular: regular rate and rhythm, normal S1 and S2, murmur noted  GI: normal bowel sounds, soft, non-distended, non-tender  Skin: warm, dry  Musculoskeletal: no lower extremity pitting edema present  Neurologic: awake, alert, oriented to name, place and time. motor is 5 out of 5 bilaterally. sensory is intact. mild tremor.    Data   Recent Labs   Lab 03/27/20  0624 03/26/20  1433 03/26/20  0615  03/24/20  2347   WBC 5.8  --   --   --  6.9   HGB 15.8  --   --   --  17.2   MCV 97  --   --   --  93   PLT 74*  --   --   --  102*     --  134  --  134   POTASSIUM 3.8 4.0 3.3*   < > 3.4   CHLORIDE 103  --  105  --  104   CO2 26  --  21  --  21   BUN 10  --  10  --  10   CR 0.91  --  0.82  --  0.90   ANIONGAP 5  --  8  --  9   ROXY 8.6  --  8.3*  --  9.2   GLC 83  --  91  --  144*   ALBUMIN 3.1*  --   --   --   --    PROTTOTAL 8.2  --   --   --   --    BILITOTAL 1.5*  --   --   --   --    ALKPHOS 70  --   --   --   --    ALT 28  --   --   --   --    AST 37  --   --   --   --    LIPASE 152  --   --   --   --     < > = values in this interval not displayed.      Medications       folic acid  1 mg Oral Daily     loratadine  10 mg Oral Daily     metoprolol tartrate  50 mg Oral BID     multivitamin w/minerals  1 tablet Oral Daily     sodium chloride (PF)  3 mL Intracatheter Q8H     vitamin B1  100 mg Oral Daily

## 2020-03-27 NOTE — PLAN OF CARE
A&O, anxious. VSS ex BP. Spo2 96 on RA. CIWA score 6 and 3. CIWA scale for anxiety, tremors, visual and auditory hallucinations and headache. Up SB.On seizure precaution, no seizure activity noted on my shift. On tele SR with BBB.

## 2020-03-27 NOTE — PLAN OF CARE
VSS on RA, CIWA scoring at 4, no seizure activity this shift. Up with SBA. Denies chest pain or SOB, also denies pain. Tolerating oral intake. Will continue to monitor.

## 2020-03-27 NOTE — CONSULTS
3/27/20 chem dep consult acknowledged.  Received completed [facility] application from , planned to meet with patient today via telehealth for evaluation.  Per nursing note, patient still in withdrawal and having hallucinations, not appropriate for interview at this time. Please reorder once patient is through withdrawal and hallucinations have subsided.

## 2020-03-27 NOTE — PLAN OF CARE
Summary: Alcohol Withdrawal    Date/Time 3/26/2020 [id number]    Service: Medical  Behavior/Aggression tool color: Green  Diagnosis: ETOH withdrawal  POD#: N/A  Mental Status: A/Ox4  Activity/dangle: SBA  Diet: Regular  Pain: c/o headache-prn tylenol given  Puentes/Voiding: voiding in bathroom  Tele/Restraints/Iso: N/A  02/LDA: Room air. PIV in Right arm  D/C Date: TBD  Other Info: A/ox4, anxious. VSS, ex hypertensive scheduled metoprolol.  CIWA score 10 and 6 10mg of Valium given.  CIWA scale for anxiety, tremors, visual and auditory hallucinations and headache. Tylenol given for headache. Up with SBA. Reg diet. Seizure precautions in place, no sz activity noted/reported. Discharge pending progress.

## 2020-03-28 LAB
MAGNESIUM SERPL-MCNC: 2 MG/DL (ref 1.6–2.3)
POTASSIUM SERPL-SCNC: 3.6 MMOL/L (ref 3.4–5.3)

## 2020-03-28 PROCEDURE — 25000132 ZZH RX MED GY IP 250 OP 250 PS 637: Performed by: INTERNAL MEDICINE

## 2020-03-28 PROCEDURE — 99207 ZZC CDG-MDM COMPONENT: MEETS MODERATE - UP CODED: CPT | Performed by: INTERNAL MEDICINE

## 2020-03-28 PROCEDURE — 25000132 ZZH RX MED GY IP 250 OP 250 PS 637: Performed by: HOSPITALIST

## 2020-03-28 PROCEDURE — 84132 ASSAY OF SERUM POTASSIUM: CPT | Performed by: HOSPITALIST

## 2020-03-28 PROCEDURE — 99232 SBSQ HOSP IP/OBS MODERATE 35: CPT | Performed by: INTERNAL MEDICINE

## 2020-03-28 PROCEDURE — 36415 COLL VENOUS BLD VENIPUNCTURE: CPT | Performed by: HOSPITALIST

## 2020-03-28 PROCEDURE — 83735 ASSAY OF MAGNESIUM: CPT | Performed by: HOSPITALIST

## 2020-03-28 PROCEDURE — 12000000 ZZH R&B MED SURG/OB

## 2020-03-28 RX ADMIN — ACETAMINOPHEN 650 MG: 325 TABLET, FILM COATED ORAL at 16:13

## 2020-03-28 RX ADMIN — MULTIPLE VITAMINS W/ MINERALS TAB 1 TABLET: TAB at 08:28

## 2020-03-28 RX ADMIN — LORATADINE 10 MG: 10 TABLET ORAL at 08:28

## 2020-03-28 RX ADMIN — METOPROLOL TARTRATE 50 MG: 50 TABLET, FILM COATED ORAL at 08:28

## 2020-03-28 RX ADMIN — POTASSIUM CHLORIDE 20 MEQ: 1500 TABLET, EXTENDED RELEASE ORAL at 08:28

## 2020-03-28 RX ADMIN — FOLIC ACID 1 MG: 1 TABLET ORAL at 08:28

## 2020-03-28 RX ADMIN — METOPROLOL TARTRATE 50 MG: 50 TABLET, FILM COATED ORAL at 20:41

## 2020-03-28 RX ADMIN — Medication 100 MG: at 08:28

## 2020-03-28 ASSESSMENT — ACTIVITIES OF DAILY LIVING (ADL)
ADLS_ACUITY_SCORE: 14
ADLS_ACUITY_SCORE: 15

## 2020-03-28 NOTE — PROGRESS NOTES
[facility]    Medicine Progress Note - Hospitalist Service       Date of Admission:  3/24/2020  Assessment & Plan   Leonel Ford is a very pleasant 54 year old male with alcohol abuse who was admitted on 3/24/2020 for delirium tremens and hallucinations associated with alcohol withdrawal.      Delirium Tremens with hallucinations   Alcohol Withdrawal  Alcohol use disorder  This is his 6th known ED/hospital presentation for alcohol intoxication or withdrawal within 4 months. He denies ever having alcohol treatment program enrollment in the past. He is willing to consider it now.   - Seizure precautions.  - CIWA with prn diazepam, prn IV/po haldol for hallucinations/agitation, prn seroquel.  - Continue thiamine, folate, MVI.  - Replace potassium, magnesium, phosphorus as needed.  - CD consultation pending.  - Less symptoms of withdrawal requiring PRN diazepam, improving continue inpatient care today and reevaluate tomorrow post CD evaluation  - patient is open to inpatient treatment which would be best for him. He is asking if this could be done close to his family who lives ab out three hours from here. Will discuss with SW and CD     Chronic thrombocytopenia  Liver cirrhosis   Hepatosplenomegaly  Secondary to longstanding alcohol abuse.  - Support abstinence or reduction in use as noted above.     Hypertension  Reports he takes a heart pill twice daily. Current medicine list indicates no PTA meds but chart review suggests he was started on metoprolol at a prior admission.  - Continue PTA metoprolol.  - Blood pressure mildly elevated 144/94       Diet: Combination Diet Regular Diet Adult    DVT Prophylaxis: Pneumatic Compression Devices  Puentes Catheter: not present  Code Status: Full Code      Disposition Plan   Expected discharge: 1-2 days pending improvement in withdrawal symptoms and chemical dependency treatment plan  Entered: Nolan Campoverde MD 03/28/2020, 1:17 PM       The patient's  care was discussed with the Bedside Nurse and Patient.    Nolan Campoverde MD  Hospitalist Service  [facility]    ______________________________________________________________________    Interval History   Leonel Ford feels bed. Improving/resolving auditory and visual hallucinations,  Denies fevers, chest pain, shortness of breath. Mild tremor and hypertension    Data reviewed today: I reviewed all medications, new labs and imaging results over the last 24 hours. I personally reviewed no images or EKG's today.    Physical Exam   Vital Signs: Temp: 98  F (36.7  C) Temp src: Oral BP: (!) 144/94 Pulse: 102 Heart Rate: 99 Resp: 16 SpO2: 96 % O2 Device: None (Room air)    Weight: 0 lbs 0 oz  Constitutional: awake, alert, cooperative, no apparent distress  Respiratory: clear to auscultation bilaterally, no crackles or wheezing  Cardiovascular: regular rate and rhythm, normal S1 and S2, murmur noted  GI: normal bowel sounds, soft, non-distended, non-tender  Skin: warm, dry  Musculoskeletal: no lower extremity pitting edema present  Neurologic: awake, alert, oriented to name, place and time. motor is 5 out of 5 bilaterally. sensory is intact. mild tremor.    Data   Recent Labs   Lab 03/28/20  0558 03/27/20  0624 03/26/20  1433 03/26/20  0615  03/24/20  2347   WBC  --  5.8  --   --   --  6.9   HGB  --  15.8  --   --   --  17.2   MCV  --  97  --   --   --  93   PLT  --  74*  --   --   --  102*   NA  --  134  --  134  --  134   POTASSIUM 3.6 3.8 4.0 3.3*   < > 3.4   CHLORIDE  --  103  --  105  --  104   CO2  --  26  --  21  --  21   BUN  --  10  --  10  --  10   CR  --  0.91  --  0.82  --  0.90   ANIONGAP  --  5  --  8  --  9   ROXY  --  8.6  --  8.3*  --  9.2   GLC  --  83  --  91  --  144*   ALBUMIN  --  3.1*  --   --   --   --    PROTTOTAL  --  8.2  --   --   --   --    BILITOTAL  --  1.5*  --   --   --   --    ALKPHOS  --  70  --   --   --   --    ALT  --  28  --   --   --   --    AST  --  37  --    --   --   --    LIPASE  --  152  --   --   --   --     < > = values in this interval not displayed.     Medications       folic acid  1 mg Oral Daily     loratadine  10 mg Oral Daily     metoprolol tartrate  50 mg Oral BID     multivitamin w/minerals  1 tablet Oral Daily     sodium chloride (PF)  3 mL Intracatheter Q8H

## 2020-03-28 NOTE — PLAN OF CARE
Pt is AOx4, CMS intact, Ciwa 3, neuro intact, mild tremors improving. tele SR with BBB, denies hallucinations and anxiety, reports feeling well. Steady, independent in room. K and Mg replaced. Pending chem dep c/s.

## 2020-03-28 NOTE — PLAN OF CARE
Pt is AOx4, CMS intact, VSS, neuro intact, Ciwa 2, independent in room, denies hallucinations, pending chem/dep c/s.

## 2020-03-28 NOTE — PLAN OF CARE
[id number]:AOx4, VSS ex. HTN within parameters, on RA. CIWA 3, 3 for  mild tremors. Denies hallucinations or anxiety, reports feeling much better. Tele SR with BBB. Steady gait, independent in room. K and Mg recheck in am.  Regular diet. Denies pain. PIV SL. Pending chem dep c/s Continue to monitor

## 2020-03-29 LAB — POTASSIUM SERPL-SCNC: 3.9 MMOL/L (ref 3.4–5.3)

## 2020-03-29 PROCEDURE — 25000132 ZZH RX MED GY IP 250 OP 250 PS 637: Performed by: HOSPITALIST

## 2020-03-29 PROCEDURE — 99232 SBSQ HOSP IP/OBS MODERATE 35: CPT | Performed by: HOSPITALIST

## 2020-03-29 PROCEDURE — 12000000 ZZH R&B MED SURG/OB

## 2020-03-29 PROCEDURE — 25000132 ZZH RX MED GY IP 250 OP 250 PS 637: Performed by: INTERNAL MEDICINE

## 2020-03-29 PROCEDURE — 36415 COLL VENOUS BLD VENIPUNCTURE: CPT | Performed by: INTERNAL MEDICINE

## 2020-03-29 PROCEDURE — 84132 ASSAY OF SERUM POTASSIUM: CPT | Performed by: INTERNAL MEDICINE

## 2020-03-29 RX ADMIN — FOLIC ACID 1 MG: 1 TABLET ORAL at 08:23

## 2020-03-29 RX ADMIN — POTASSIUM CHLORIDE 20 MEQ: 1500 TABLET, EXTENDED RELEASE ORAL at 08:23

## 2020-03-29 RX ADMIN — ACETAMINOPHEN 650 MG: 325 TABLET, FILM COATED ORAL at 08:26

## 2020-03-29 RX ADMIN — METOPROLOL TARTRATE 50 MG: 50 TABLET, FILM COATED ORAL at 08:23

## 2020-03-29 RX ADMIN — MULTIPLE VITAMINS W/ MINERALS TAB 1 TABLET: TAB at 08:23

## 2020-03-29 RX ADMIN — METOPROLOL TARTRATE 50 MG: 50 TABLET, FILM COATED ORAL at 21:00

## 2020-03-29 RX ADMIN — LORATADINE 10 MG: 10 TABLET ORAL at 08:23

## 2020-03-29 ASSESSMENT — ACTIVITIES OF DAILY LIVING (ADL)
ADLS_ACUITY_SCORE: 14

## 2020-03-29 NOTE — PROGRESS NOTES
[facility]    Hospitalist Progress Note      Assessment & Plan   Leonel Ford is a 54 year old male who was admitted on 3/24/2020 for delirium tremens and hallucinations associated with alcohol withdrawal.     Delirium Tremens with hallucinations   Alcohol Withdrawal  Alcohol use disorder  6th known ED/hospital presentation for alcohol intoxication or withdrawal within 4 months. He denies ever having alcohol treatment program enrollment in the past. He is willing to consider it now and is hopeful for inpatient since he doesn't believe he will succeed in outpatient program  - Seizure precautions.  - CIWA with prn diazepam, prn IV/po haldol for hallucinations/agitation, prn seroquel.  - Continue folate, MVI.  - Replace potassium, magnesium, phosphorus as needed.  - CD consultation pending, re-consulted on 3/29 as hallucinations resolved  - Last required diazepam on 3/26, CIWA has since been 2-3 range  - patient is open to inpatient treatment which would be best for him. He is asking if this could be done close to his family who lives about three hours from here (near [place]). Discussed with HERLINDA      Chronic thrombocytopenia  Liver cirrhosis   Hepatosplenomegaly  Secondary to longstanding alcohol abuse.  - Support abstinence or reduction in use as noted above.     Hypertension  Reports he takes a heart pill twice daily. Current medicine list indicates no PTA meds but chart review suggests he was started on metoprolol at a prior admission.  - Continue PTA metoprolol, 50mg BID  - Blood pressure mildly elevated 136/90-- continue to monitor    DVT Prophylaxis: Pneumatic Compression Devices  Code Status: Full Code  Expected discharge: 24-48 hours, recommended to treatment facility (hopefully). Otherwise stable for discharge once CD consult occurs and potential placement found    Sharon Willis, DO  Text Page (7am - 6pm)    Interval History   Patient seen and examined. No chest pain, shortness of breath,  nausea, vomiting, fevers, chills. Tolerating diet. Some minor shakes, but not as bad as before. No further hallucinations. Eager to get started with treatment    -Data reviewed today: I reviewed all new labs and imaging results over the last 24 hours. I personally reviewed no images or EKG's today.    Physical Exam   Temp: 98.6  F (37  C) Temp src: Oral BP: (!) 136/90 Pulse: 94 Heart Rate: 90 Resp: 16 SpO2: 96 % O2 Device: None (Room air)    There were no vitals filed for this visit.  Vital Signs with Ranges  Temp:  [97.9  F (36.6  C)-98.6  F (37  C)] 98.6  F (37  C)  Pulse:  [85-94] 94  Heart Rate:  [81-90] 90  Resp:  [14-18] 16  BP: (113-138)/(76-91) 136/90  SpO2:  [95 %-100 %] 96 %  I/O last 3 completed shifts:  In: 200 [P.O.:200]  Out: -     Constitutional: Awake, alert, cooperative, no apparent distress  Respiratory: Clear to auscultation bilaterally, crackles at bilateral bases, no wheeze or rhonchi  Cardiovascular: Regular rate and rhythm, normal S1 and S2, and no murmur noted  GI: Normal bowel sounds, soft, non-distended, non-tender  Skin/Integumen: No rashes, no cyanosis, no edema  Other: Mild tremor    Medications       folic acid  1 mg Oral Daily     loratadine  10 mg Oral Daily     metoprolol tartrate  50 mg Oral BID     multivitamin w/minerals  1 tablet Oral Daily     sodium chloride (PF)  3 mL Intracatheter Q8H       Data   Recent Labs   Lab 03/29/20  0608 03/28/20  0558 03/27/20  0624  03/26/20  0615  03/24/20  2347   WBC  --   --  5.8  --   --   --  6.9   HGB  --   --  15.8  --   --   --  17.2   MCV  --   --  97  --   --   --  93   PLT  --   --  74*  --   --   --  102*   NA  --   --  134  --  134  --  134   POTASSIUM 3.9 3.6 3.8   < > 3.3*   < > 3.4   CHLORIDE  --   --  103  --  105  --  104   CO2  --   --  26  --  21  --  21   BUN  --   --  10  --  10  --  10   CR  --   --  0.91  --  0.82  --  0.90   ANIONGAP  --   --  5  --  8  --  9   ROXY  --   --  8.6  --  8.3*  --  9.2   GLC  --   --  83  --  91   --  144*   ALBUMIN  --   --  3.1*  --   --   --   --    PROTTOTAL  --   --  8.2  --   --   --   --    BILITOTAL  --   --  1.5*  --   --   --   --    ALKPHOS  --   --  70  --   --   --   --    ALT  --   --  28  --   --   --   --    AST  --   --  37  --   --   --   --    LIPASE  --   --  152  --   --   --   --     < > = values in this interval not displayed.       No results found for this or any previous visit (from the past 24 hour(s)).

## 2020-03-29 NOTE — PLAN OF CARE
Pt is AOx4, CMS intact, neuro baseline, Ciwa 2, mild tremors, denies hallucinations, no events during shift. Independent in room. Pending chem/dep placement

## 2020-03-29 NOTE — PLAN OF CARE
Pt A/Ox4. VSS on RA. CMS intact, mild tremor BUE. CIWA 2. Denies hallucinations. Pending chem dep consult/placement. Will continue to monitor.

## 2020-03-29 NOTE — PLAN OF CARE
VSS on RA, CMS intact mild tremor noted on BUE. Denies any SOB and chest pain. C/o headache- subsided with tylenol. Up independently in room. Will continue to monitor.

## 2020-03-29 NOTE — PROGRESS NOTES
"The patient has been notified of the following:     \"We have found that certain health care needs can be provided without the need for a face to face visit.  This service lets us provide the care you need with a phone conversation.      I will have full access to your [facility] medical record during this entire phone call.   I will be taking notes for your medical record.     Since this is like an office visit, we will bill your insurance company for this service.  If your insurance denies the charge we will appeal and/or write off the cost of the service.  The Governor's executive order may result in expanded health insurance coverage for this service, which would be paid by your insurance.         There are potential benefits and risks of telephone visits (e.g. limits to patient confidentiality) that differ from in-person visits.?  Confidentiality still applies for telephone services, and nobody will record the visit.  It is important to be in a quiet, private space that is free of distractions (including cell phone or other devices) during the visit.??     If during the course of the call I believe a telephone visit is not appropriate, you will not be charged for this service\"    Consent has been obtained for this service by care team member: Yes    Phone visit start time:  1625  Phone visit end time:  1734    Rule 25 Assessment  Background Information   1. Date of Assessment Request  2. Date of Assessment  3/29/2020 3. Date Service Authorized     4.   REID Nguyễn   5.  Phone Number   [phone] 6. Referent  Self 7. Assessment Site  [address] SPECIALTY UNIT     8. Client Name   Leonel Ford 9. Date of Birth  1965 Age  54 year old 10. Gender  male  11. PMI/ Insurance No.  [id number]   12. Client's Primary Language:  English 13. Do you require special accommodations, such as an  or assistance with written material? No   14. Current Address: [address]   [facility] [facility] [address]   15. Client Phone Numbers: [phone] (home)      16. Tell me what has happened to bring you here today.  Per 3/24 ED Provider Note:  JUANIS   Leonel Ford is a 54 year old male with history of hypertension who presents for evaluation of alcohol withdrawal. The patient states for the past 3 weeks he has been drinking four 1.75 L of whiskey per week. This morning between 0530 and 0600 he had his last drink and began to develop tremors. He noted his symptoms were similar in presentation to her previous alcohol withdrawal, prompting his presentation.  Here, the patient states he does not have a history of alcohol withdrawal seizures. He also endorses visual hallucinations. He notes he has been to detox once in the past. He denies any other symptoms prompting his presentation.      17. Have you had other rule 25 assessments?   No      DIMENSION I - Acute Intoxication /Withdrawal Potential   1. Chemical use most recent 12 months outside a facility and other significant use history (client self-report)              X = Primary Drug Used   Age of First Use Most Recent Pattern of Use and Duration   Need enough information to show pattern (both frequency and amounts) and to show tolerance for each chemical that has a diagnosis   Date of last use and time, if needed   Withdrawal Potential? Requiring special care Method of use  (oral, smoked, snort, IV, etc)      Alcohol     20 Since 9/2019: 1.75L daily or every other day  Until 9/2019: 1.75L would last a work week 3/24/20 Yes Oral      Marijuana/  Hashish   No use No use No use No use No use      Cocaine/Crack     No use No use No use No use No use      Meth/  Amphetamines   No use No use No use No use No use      Heroin     No use No use No use No use No use      Other Opiates/  Synthetics   No use No use No use No use No use      Inhalants     No use No use No use No use No use      Benzodiazepines     No use No use No use No use  No use      Hallucinogens     No use No use No use No use No use      Barbiturates/  Sedatives/  Hypnotics No use No use No use No use No use      Over-the-Counter Drugs   No use No use No use No use No use      Other     No use No use No use No use No use      Nicotine     No use No use No use No use No use     2. Do you use greater amounts of alcohol/other drugs to feel intoxicated or achieve the desired effect?  Yes.  Or use the same amount and get less of an effect?  Yes.  Example: The patient reported having increased use and tolerance issues with alcohol.    3A. Have you ever been to detox?  Yes    3B. When was the first time?  December 2019    3C. How many times since then?  No    3D. Date of most recent detox:  December 2019    4.  Withdrawal symptoms: Have you had any of the following withdrawal symptoms?  Past 12 months Recent (past 30 days)   Sweating (Rapid Pulse)  Shaky / Jittery / Tremors  Unable to Sleep  Agitation  Fatigue / Extremely Tired  Sad / Depressed Feeling  Vivid / Unpleasant Dreams  Irritability  High Blood Pressure  Nausea / Vomiting  Diminished Appetite  Hallucinations  Unable to Eat  Psychosis  Confused / Disrupted Speech  Anxiety / Worried Sweating (Rapid Pulse)  Shaky / Jittery / Tremors  Unable to Sleep  Agitation  Fatigue / Extremely Tired  Sad / Depressed Feeling  Vivid / Unpleasant Dreams  Irritability  High Blood Pressure  Nausea / Vomiting  Diminished Appetite  Hallucinations  Unable to Eat  Psychosis  Confused / Disrupted Speech  Anxiety / Worried        's Visual Observations and Symptoms: No visible withdrawal symptoms at this time    Based on the above information, is withdrawal likely to require attention as part of treatment participation?  No - patient's withdrawal symptoms have resolved at this time.    Dimension I Ratings   Acute intoxication/Withdrawal potential - The placing authority must use the criteria in Dimension I to determine a client s acute  intoxication and withdrawal potential.    RISK DESCRIPTIONS - Severity ratin Client displays full functioning with good ability to tolerate and cope with withdrawal discomfort. No signs or symptoms of intoxication or withdrawal or resolving signs or symptoms.    REASONS SEVERITY WAS ASSIGNED (What about the amount of the person s use and date of most recent use and history of withdrawal problems suggests the potential of withdrawal symptoms requiring professional assistance? )     Patient reported daily alcohol use since his regular use began around age 20. For the past 6 months, he has used a 1.75 liter of whiskey everyday or every other day. Prior to 6 months ago, he would use a 1.75 liter over the course of 5 days. The patient is currently hospitalized at [facility]. While admitted to the hospital, his symptomology is being monitored and treated. He will not be discharged until medically stable.         DIMENSION II - Biomedical Complications and Conditions   1a. Do you have any current health/medical conditions?(Include any infectious diseases, allergies, or chronic or acute pain, history of chronic conditions)     Yes.   Illnesses/Medical Conditions you are receiving care for: hypertension  Per 3/25/20 H&P:  Chronic thrombocytopenia  Liver cirrhosis   Hepatosplenomegaly  Secondary to longstanding alcohol abuse.  -support abstinence or reduction in use     1b. On a scale of mild, moderate to severe please specify the severity of the patient's diabetes and/or neuropathy.  The patient denied having a history of being diagnosed with diabetes or neuropathy.    2. Do you have a health care provider? When was your most recent appointment? What concerns were identified?   The patient does not have a PCP at this time.    3. If indicated by answers to items 1 or 2: How do you deal with these concerns? Is that working for you? If you are not receiving care for this problem, why not?    The patient  reported taking prescription medications as prescribed for the above medical issues.    4A. List current medication(s) including over-the-counter or herbal supplements--including pain management:   Current Facility-Administered Medications   Medication     acetaminophen (TYLENOL) tablet 650 mg     bisacodyl (DULCOLAX) Suppository 10 mg     diazepam (VALIUM) tablet 10 mg    Or     diazepam (VALIUM) injection 5-10 mg     folic acid (FOLVITE) tablet 1 mg     haloperidol (HALDOL) tablet 2 mg     haloperidol lactate (HALDOL) injection 2 mg     lidocaine (LMX4) cream     lidocaine 1 % 0.1-1 mL     loratadine (CLARITIN) tablet 10 mg     magnesium sulfate 2 g in water intermittent infusion     magnesium sulfate 4 g in 100 mL sterile water (premade)     melatonin tablet 1 mg     metoprolol tartrate (LOPRESSOR) tablet 50 mg     multivitamin w/minerals (THERA-VIT-M) tablet 1 tablet     naloxone (NARCAN) injection 0.1-0.4 mg     ondansetron (ZOFRAN-ODT) ODT tab 4 mg    Or     ondansetron (ZOFRAN) injection 4 mg     polyethylene glycol (MIRALAX) Packet 17 g     potassium chloride (KLOR-CON) Packet 20-40 mEq     potassium chloride 10 mEq in 100 mL intermittent infusion with 10 mg lidocaine     potassium chloride 10 mEq in 100 mL sterile water intermittent infusion (premix)     potassium chloride 20 mEq in 50 mL intermittent infusion     potassium chloride ER (KLOR-CON M) CR tablet 20-40 mEq     potassium phosphate 10 mmol in D5W 250 mL intermittent infusion     potassium phosphate 15 mmol in D5W 250 mL intermittent infusion     potassium phosphate 20 mmol in D5W 250 mL intermittent infusion     potassium phosphate 20 mmol in D5W 500 mL intermittent infusion     potassium phosphate 25 mmol in D5W 500 mL intermittent infusion     QUEtiapine (SEROquel) tablet 25-50 mg     senna-docusate (SENOKOT-S/PERICOLACE) 8.6-50 MG per tablet 1 tablet    Or     senna-docusate (SENOKOT-S/PERICOLACE) 8.6-50 MG per tablet 2 tablet     sodium  chloride (PF) 0.9% PF flush 3 mL     sodium chloride (PF) 0.9% PF flush 3 mL     4B. Do you follow current medical recommendations/take medications as prescribed?  Yes    4C. When did you last take your medication?  Today    4D. Do you need a referral to have a follow up with a primary care physician?  No.    5. Has a health care provider/healer ever recommended that you reduce or quit alcohol/drug use?  Yes    6. Are you pregnant?  NA, because the patient is male      7. Have you had any injuries, assaults/violence towards you, accidents, health related issues, overdose(s) or hospitalizations related to your use of alcohol or other drugs:   Alcohol withdrawal syndrome without complication; alcohol intoxication delirium; Acute hypokalemia; Chronic alcohol dependence, continuous; Delirium tremens; Gross hematuria;  Thrombocytopenia; Alcohol withdrawal syndrome with perceptual disturbance  3/24/20-present Admission [facility]  3/24/20  ED  [facility]  3/1/20   ED  [facility]  20-3/1/20  ED  [facility]  19-19 Admission [facility]  19-19 ED  [facility]    8. Do you have any specific physical needs/accommodations?  No    Dimension II Ratings   Biomedical Conditions and Complications - The placing authority must use the criteria in Dimension II to determine a client s biomedical conditions and complications.   RISK DESCRIPTIONS - Severity ratin Client tolerates and poornima with physical discomfort and is able to get the services that the client needs.    REASONS SEVERITY WAS ASSIGNED (What physical/medical problems does this person have that would inhibit his or her ability to participate in treatment? What issues does he or she have that require assistance to address?)    The patient reported having a history of hypertension and denied having any other history of chronic medical problems. The patient reported taking a medication for high blood pressure, but he denied  taking any other prescription or OTC medications at this time. The patient would benefit from following all of the recommendations of his medical providers.         DIMENSION III - Emotional, Behavioral, Cognitive Conditions and Complications   1. (Optional) Tell me what it was like growing up in your family. (substance use, mental health, discipline, abuse, support)   Who raised you? Parents  Siblings: 2 older sisters 1 younger brother  Do you have a family history of mental health issues? No  Do you have a family history of Substance Use Disorders? Dad used to drink quite a bit but he quit    2. When was the last time that you had significant problems...  A. with feeling very trapped, lonely, sad, blue, depressed or hopeless  about the future? 2 - 12 months ago    B. with sleep trouble, such as bad dreams, sleeping restlessly, or falling  asleep during the day? Past Month    C. with feeling very anxious, nervous, tense, scared, panicked, or like  something bad was going to happen? 2 - 12 months ago    D. with becoming very distressed and upset when something reminded  you of the past? Never    E. with thinking about ending your life or committing suicide? 1+ years ago    3. When was the last time that you did the following things two or more times?  A. Lied or conned to get things you wanted or to avoid having to do  something?  2-12 months ago    B. Had a hard time paying attention at school, work, or home? Never    C. Had a hard time listening to instructions at school, work, or home? Never    D. Were a bully or threatened other people? Never    E. Started physical fights with other people? Never    Note: These questions are from the Global Appraisal of Individual Needs--Short Screener. Any item marked  past month  or  2 to 12 months ago  will be scored with a severity rating of at least 2.     For each item that has occurred in the past month or past year ask follow up questions to determine how often the  person has felt this way or has the behavior occurred? How recently? How has it affected their daily living? And, whether they were using or in withdrawal at the time?    The patient did not identify as having any of the above items in the past month.    4A. If the person has answered item 2E with  in the past year  or  the past month , ask about frequency and history of suicide in the family or someone close and whether they were under the influence.   Any history of suicide in your family? Or someone close to you?   The patient denied any family member or someone close to the patient had ever completed suicide.    4B. If the person answered item 2E  in the past month  ask about  intent, plan, means and access and any other follow-up information  to determine imminent risk. Document any actions taken to intervene  on any identified imminent risk.    The patient denied having any suicide ideation within the past month.    5A. Have you ever been diagnosed with a mental health problem?  No      5B. Are you receiving care for any mental health issues? If yes, what is the focus of that care or treatment?  Are you satisfied with the service? Most recent appointment?  How has it been helpful?   The patient denied having any current or past mental health issues that had required treatment.    6. Have you been prescribed medications for emotional/psychological problems?  The patient denied having any history of being prescribed psychotropic medications for mental health issues.    7. Does your MH provider know about your use?  The patient does not currently have any mental health providers.    8A. Have you ever been verbally, emotionally, physically or sexually abused?  No  The patient denied having any history of being verbally, emotionally, physically or sexually abused.    8B. Have you received counseling for abuse?  The patient denied having any history of being verbally, emotionally, physically or sexually  abused.    9. Have you ever experienced or been part of a group that experienced community violence, historical trauma, rape or assault?  No    10A. :  No    11. Do you have problems with any of the following things in your daily life?  No    The patient denied having any history of having problems with headaches, dizziness, problem solving, concentration, performing job/school work, remembering, in relationships with others, reading, writing, calculation, fights, being fired or arrests in his daily life.    Note: If the person has any of the above problems, follow up with items 12, 13, and 14. If none of the issues in item 11 are a problem for the person, skip to item 15.    12. Have you been diagnosed with traumatic brain injury or Alzheimer s?  No    13. If the answer to #12 is no, ask the following questions:    Have you ever hit your head or been hit on the head?  No    Were you ever seen in the Emergency Room, hospital or by a doctor because of an injury to your head?  No    Have you had any significant illness that affected your brain (brain tumor, meningitis, West Nile Virus, stroke or seizure, heart attack, near drowning or near suffocation)? No    14. If the answer to #12 is yes, ask if any of the problems identified in #11 occurred since the head injury or loss of oxygen.  The patient had never had a head injury or a loss of oxygen.    15A. Highest grade of school completed:  Some college, but no degree    15B. Do you have a learning disability?  No    15C. Did you ever have tutoring in Math or English?  No    15D. Have you ever been diagnosed with Fetal Alcohol Effects or Fetal Alcohol Syndrome?  No    16. If yes to item 15 B, C, or D: How has this affected your use or been affected by your use?  The patient denied having any history of a learning disability, tutoring in math or English or being diagnosed with Fetal Alcohol Effects or Fetal Alcohol Syndrome.    Dimension III Ratings  "  Emotional/Behavioral/Cognitive - The placing authority must use the criteria in Dimension III to determine a client s emotional, behavioral, and cognitive conditions and complications.   RISK DESCRIPTIONS - Severity ratin Client has impulse control and coping skills. Client presents a mild to moderate risk of harm to self or others or displays symptoms of emotional, behavioral or cognitive problems. Client has a mental health diagnosis and is stable. Client functions adequately in significant life areas.    REASONS SEVERITY WAS ASSIGNED - What current issues might with thinking, feelings or behavior pose barriers to participation in a treatment program? What coping skills or other assets does the person have to offset those issues? Are these problems that can be initially accommodated by a treatment provider? If not, what specialized skills or attributes must a provider have?    The patient denied current or previous mental health diagnoses. He acknowledged some symptoms of depression and anxiety related to his alcohol use and job loss.         DIMENSION IV - Readiness for Change   1. You ve told me what brought you here today. (first section) What do you think the problem really is?   This is the first time I've tried to get help on my own.    2. Tell me how things are going. Ask enough questions to determine whether the person has use related problems or assets that can be built upon in the following areas: Family/friends/relationships; Legal; Financial; Emotional; Educational; Recreational/ leisure; Vocational/employment; Living arrangements (DSM)    Friends/Family/Relationships: \"Friends- don't have many. Family- certain are good and certain are bed.\"  Living situation: No concerns  Legal: No  Financial: Getting down there I suppose  Employment: Quit job in September    3. What activities have you engaged in when using alcohol/other drugs that could be hazardous to you or others (i.e. driving a " car/motorcycle/boat, operating machinery, unsafe sex, sharing needles for drugs or tattoos, etc   The patient reported having a history of driving while under the influence of alcohol or drugs and having unsafe sex.    4. How much time do you spend getting, using or getting over using alcohol or drugs? (DSM)   It depends on what's going on. When I was working, I never drank before or during work. It was from the time I got home until a few hours later. Since I haven't been working    5. Reasons for drinking/drug use (Use the space below to record answers. It may not be necessary to ask each item.)  Like the feeling Yes   Trying to forget problems No   To cope with stress Yes   To relieve physical pain No   To cope with anxiety Yes   To cope with depression Yes   To relax or unwind Yes   Makes it easier to talk with people Yes   Partner encourages use No   Most friends drink or use No   To cope with family problems Yes   Afraid of withdrawal symptoms/to feel better Yes   Other (specify)  No     A. What concerns other people about your alcohol or drug use/Has anyone told you that you use too much? What did they say? (DSM)   Everyone    B. What did you think about that/ do you think you have a problem with alcohol or drug use?   Not until the end here    6. What changes are you willing to make? What substance are you willing to stop using? How are you going to do that? Have you tried that before? What interfered with your success with that goal?    His plan and goal is to abstain from alcohol and from all other non-prescribed mood altering chemicals.     7. What would be helpful to you in making this change?   I never really thought about that question. Just being away from it. I would imagine talking to other people.    Dimension IV Ratings   Readiness for Change - The placing authority must use the criteria in Dimension IV to determine a client s readiness for change.   RISK DESCRIPTIONS - Severity ratin Client  is motivated with active reinforcement, to explore treatment and strategies for change, but ambivalent about illness or need for change.    REASONS SEVERITY WAS ASSIGNED - (What information did the person provide that supports your assessment of his or her readiness to change? How aware is the person of problems caused by continued use? How willing is she or he to make changes? What does the person feel would be helpful? What has the person been able to do without help?)      The patient acknowledges that he has a problem with alcohol.  He appears to be in the preparation stage of change based on his reports and behaviors.  He is willing to enter residential treatment.         DIMENSION V - Relapse, Continued Use, and Continued Problem Potential   1A. In what ways have you tried to control, cut-down or quit your use? If you have had periods of sobriety, how did you accomplish that? What was helpful? What happened to prevent you from continuing your sobriety? (DSM)   3 weeks after I went to detox when I was staying at my parents. My mom and dad needed help so I stayed with them.    1B. What were the circumstances of your most recent relapse with mood altering chemicals?  Came back home    2. Have you experienced cravings? If yes, ask follow up questions to determine if the person recognizes triggers and if the person has had any success in dealing with them.   The patient reported having cravings to use mood altering chemicals on an almost daily basis.    3. Have you been treated for alcohol/other drug abuse/dependence? Please List the names/locations/approximate dates of previous treatments:  No    4. Support group participation: Have you/do you attend support group meetings to reduce/stop your alcohol/drug use? How recently? What was your experience? Are you willing to restart? If the person has not participated, is he or she willing?   The patient denied having any history of attending 12-step or other support  "group meetings.    5. What would assist you in staying sober/straight?   I'm not sure, that's what treatment will help me with.    Dimension V Ratings   Relapse/Continued Use/Continued problem potential - The placing authority must use the criteria in Dimension V to determine a client s relapse, continued use, and continued problem potential.   RISK DESCRIPTIONS - Severity ratin No awareness of the negative impact of mental health problems or substance abuse. No coping skills to arrest mental health or addiction illnesses, or prevent relapse.    REASONS SEVERITY WAS ASSIGNED - (What information did the person provide that indicates his or her understanding of relapse issues? What about the person s experience indicates how prone he or she is to relapse? What coping skills does the person have that decrease relapse potential?)      The patient appears to lack sober coping skills and he lacks long-term sober maintenance skills.  The patient reported being socially isolated which could be a barrier to his recovery.  The patient reported his current unemployment is a potential trigger for him to abuse mood altering chemicals.  The patient lacks awareness regarding the disease model of addiction.  The patient lacks a sober peer support network.         DIMENSION VI - Recovery Environment   1. Are you employed/attending school? Tell me about that.   No  If not employed, last time worked: 2019 (had been at that job for 11 years)    2A. Describe a typical day; evening for you. Work, school, social, leisure, volunteer, spiritual practices. Include time spent obtaining, using, recovering from drugs or alcohol. (DSM)   Depends- haven't been working, so I've been drinking throughout the entire day. Recently started going to casinos \"just to get out\". When I was working, I never drank before work or during work.    Please describe what leisure activities have been associated with your substance abuse:  Gambling, " drinking at home, fishing    2B. How often do you spend more time than you planned using or use more than you planned? (DSM)   Everyday    3. How important is using to your social connections? Do many of your family or friends use?   Not really    4A. Are you currently in a significant relationship?  No    4C. Sexual Orientation:  Heterosexual    5A. Who do you live with?  The patient lives alone.    5B. Tell me about their alcohol/drug use and mental health issues.   The patient lives alone.    5C. Are you concerned for your safety there?  No    5D. Are you concerned about the safety of anyone else who lives with you?  No    6A. Do you have children who live with you?   No    6B. Do you have children who do not live with you?   No    7A. Who supports you in making changes in your alcohol or drug use? Would you like your family to participate in your treatment? If so, how? What are they willing to do to support you? Who is upset or angry about you making changes in your alcohol or drug use? How big a problem is this for you?   Mom and oldest sister     7B. This table is provided to record information about the person s relationships and available support It is not necessary to ask each item; only to get a comprehensive picture of their support system.  How often can you count on the following people when you need someone?   Partner / Spouse The patient does not have a current partner or spouse.   Parent(s)/Aunt(s)/Uncle(s)/Grandparents Always supportive   Sibling(s)/Cousin(s) Always supportive   Child(kailey) The patient doesn't have any children.   Other relative(s) The patient doesn't have any current contact with other relatives.   Friend(s)/neighbor(s) Always supportive   Child(kailey) s father(s)/mother(s) The patient doesn't have any children.   Support group member(s) The patient denied having any current involvement with 12-step or other support group meetings.   Community of chance members The patient denied  having any current involvement with community chance members.   /counselor/therapist/healer The patient denied having any current involvement with a , counselor, therapist or healer.   Other (specify) No     8A. What is your current living situation?   Rent apartment in [place] - also spends time in [place] with parents    8B. What is your long term plan for where you will be living?   I've paid rent for the rest of this year    8C. Tell me about your living environment/neighborhood? Ask enough follow up questions to determine safety, criminal activity, availability of alcohol and drugs, supportive or antagonistic to the person making changes.    I live near [facility], so there are bars and liquor stores nearby. It's safe though, I feel safe there.    9. Criminal justice history: Gather current/recent history and any significant history related to substance use--Arrests? Convictions? Circumstances? Alcohol or drug involvement? Sentences? Still on probation or parole? Expectations of the court? Current court order? Any sex offenses - lifetime? What level? (DSM)  DUI 22 years ago  No other legal charges    10. What obstacles exist to participating in treatment? (Time off work, childcare, funding, transportation, pending group home time, living situation)   The patient denied having any obstacles for participating in substance abuse treatment.    Dimension VI Ratings   Recovery environment - The placing authority must use the criteria in Dimension VI to determine a client s recovery environment.   RISK DESCRIPTIONS - Severity rating: 3 Client is not engaged in structured, meaningful activity and the client's peers, family, significant other, and living environment are unsupportive, or there is significant criminal justice system involvement.    REASONS SEVERITY WAS ASSIGNED - (What support does the person have for making changes? What structure/stability does the person have in his or her daily  life that will increase the likelihood that changes can be sustained? What problems exist in the person s environment that will jeopardize getting/staying clean and sober?)     The patient is unemployed and lives alone. He is not in a relationship and does not have any children. He does not have any criminal justice charges. He is not engaged in any structured, meaningful activities and is not connected to a sober community.         Client Choice/Exceptions   What is your cultural identification?  White    Would you like services specific to language, age, gender, culture, Church preference, race, ethnicity, sexual orientation or disability?  No    What particular treatment choices and options would you like to have? Residential treatment; parents live in [place], would like to be close to them if possible.    Do you have a preference for a particular treatment program? None    Criteria for Diagnosis     Criteria for Diagnosis  DSM-5 Criteria for Substance Use Disorder  Instructions: Determine whether the client currently meets the criteria for Substance Use Disorder using the diagnostic criteria in the DSM-V pp.481-589. Current means during the most recent 12 months outside a facility that controls access to substances    Category of Substance Severity (ICD-10 Code / DSM 5 Code)     Alcohol Use Disorder Severe  (10.20) (303.90)   Cannabis Use Disorder The patient does not meet the criteria for a Cannabis use disorder.   Hallucinogen Use Disorder The patient does not meet the criteria for a Hallucinogen use disorder.   Inhalant Use Disorder The patient does not meet the criteria for an Inhalant use disorder.   Opioid Use Disorder The patient does not meet the criteria for an Opioid use disorder.   Sedative, Hypnotic, or Anxiolytic Use Disorder The patient does not meet the criteria for a Sedative/Hypnotic use disorder.   Stimulant Related Disorder The patient does not meet the criteria for a Stimulant use  disorder.   Tobacco Use Disorder The patient does not meet the criteria for a Tobacco use disorder.   Other (or unknown) Substance Use Disorder The patient does not meet the criteria for a Other (or unknown) Substance use disorder.       Collateral Contact Summary   Number of contacts made: 1    Contact with referring person:  Yes    If court related records were reviewed, summarize here: No court records had been reviewed at the time of this documentation.    Information from collateral contacts supported/largely agreed with information from the client and associated risk ratings.    Rule 25 Assessment Summary and Plan     's Recommendation  1. Abstain from all non-prescribed mood-altering substances  2. Take all medications as prescribed  3. Enter and complete a residential treatment program  4. Follow all recommendations upon discharge from treatment. Recommendations may include, but are not limited to: extended treatment, outpatient treatment and/or sober housing.  5. Follow all recommendations of your medical providers    Collateral Contacts     Name:    Dr. Jeanette Vasques MD   Relationship:    Physician  Hospitalist   Phone Number:    N/A Releases:    N/A     [facility]  History and Physical  Hospitalist       Date of Admission:  3/24/2020    Assessment & Plan  Leonel Ford is a very pleasant 54 year old male with alcohol abuse who was admitted on 3/24/2020 for delirium tremens and hallucinations associated with alcohol withdrawal.      Delirium Tremens with hallucinations   Alcohol Withdrawal  This is his 6th known ED/hospital presentation for alcohol intoxication or withdrawal within 4 months. He denies ever having alcohol treatment program enrollment in the past. He is willing to consider it now.   -admit to medicine unit  -seizure precautions  -CIWA with prn diazepam, prn IV/po haldol for hallucinations/agitation, prn seroquel  -thiamine, folate, MVI  -replace potassium,  "magnesium, phosphorus as needed  -CD consultation     Chronic thrombocytopenia  Liver cirrhosis   Hepatosplenomegaly  Secondary to longstanding alcohol abuse.  -support abstinence or reduction in use     Hypertension  Reports he takes a heart pill twice daily. Current medicine list indicates no PTA meds but chart review suggests he was started on metoprolol at a prior admission.  -resume PTA metoprolol once dose reconciled      DVT prophylaxis: Pneumatic Compression Devices and Ambulate every shift  Code Status: Full     Disposition: Expected discharge in 2-3 days.     Jeanette Vasques MD  Text Page     ~~~~~~~~~~~~~~~~~~~~~~~~~~~~~~~~~~~~~~~~~~~~~~~~~~~~~     Primary Care Physician   No primary care provider on file.        Chief Complaint   Hallucinations, tremors     History is obtained from the patient and medical records.    History of Present Illness  Leonel Ford is a very pleasant 54 year old male with alcohol abuse, hypertension who presents with tremors and hallucinations. Typically drinks 1.75 L daily of whiskey. This is the 6th time in 4 months that he has been seen in the ED or admitted to the hospital for alcohol-related problems. Denies he has ever been in a treatment program before but has explored the possibility of a Rule 25 for treatment funding. Reports last drink of alcohol was ~ 5:30 AM on 3/24 (Tuesday). Having visual hallucinations and appears hypervigilant. Denies chest pain, shortness of breath, cough, abdominal pain. Reports his only medication is a \"heart pill\" that he takes twice a day. Denies suicidal ideation.     Case reviewed with Dr. Ortiz from the Emergency Department. Patient was tachycardic to 110, hypertensive 162/101, afebrile, normal O2 satsLabs reviewed. Received 6 mg IV ativan total in ED, thiamine, folate, 1 L IVF.      Past Medical History   I have reviewed this patient's medical history and updated it with pertinent information if needed.   Hypertension  Alcohol " Abuse    Past Surgical History   I have reviewed this patient's surgical history and updated it with pertinent information if needed.  Denies any prior surgeries.      Prior to Admission Medications   None     Allergies        Allergies   Allergen Reactions     Penicillins       Social History   I have reviewed this patient's social history and updated it with pertinent information if needed. Leonel Ford  is a nonsmoker. He has remote history of drug use and current daily alcohol use. Lives in [facility].      Family History   I have reviewed this patient's family history and updated it with pertinent information if needed.   Denies family history of alcohol abuse.     Review of Systems   The 10 point Review of Systems is negative other than noted in the HPI or here.     Physical Exam  Temp: 98.7  F (37.1  C) Temp src: Oral BP: 133/72   Heart Rate: 110 Resp: 18 SpO2: 96 %      Vital Signs with Ranges  Temp:  [98.7  F (37.1  C)] 98.7  F (37.1  C)  Heart Rate:  [110] 110  Resp:  [18] 18  BP: (133)/(72) 133/72  SpO2:  [96 %] 96 %  0 lbs 0 oz     Constitutional: Alert, NAD, pleasant and interactive  Eyes: PERRL, sclerae anicteric  HEENT: mmm, atraumatic, +tongue fasciculations   Respiratory:     Lungs CTAB, no wheezes or crackles  Cardiovascular: tachycardic, regular, no murmurs  no LE edema  GI: obese, soft, non-tender, nondistended  Skin/Integument: diaphoretic, warm, dry, no acute rashes  Musc: MAURICE, normal limb strength x 4  Neuro: AOx3, no focal deficits, + tremors of outstretched hands  Psych: mildly anxious, +hallucinations, appears to be hypervigilant         A problematic pattern of alcohol/drug use leading to clinically significant impairment or distress, as manifested by at least two of the following, occurring within a 12-month period:    1.) Alcohol/drug is often taken in larger amounts or over a longer period than was intended.  3.) A great deal of time is spent in activities necessary to obtain alcohol, use  alcohol, or recover from its effects.  4.) Craving, or a strong desire or urge to use alcohol/drug  5.) Recurrent alcohol/drug use resulting in a failure to fulfill major role obligations at work, school or home.  6.) Continued alcohol use despite having persistent or recurrent social or interpersonal problems caused or exacerbated by the effects of alcohol/drug.  7.) Important social, occupational, or recreational activities are given up or reduced because of alcohol/drug use.  9.) Alcohol/drug use is continued despite knowledge of having a persistent or recurrent physical or psychological problem that is likely to have been caused or exacerbated by alcohol.  10.) Tolerance, as defined by either of the following: A need for markedly increased amounts of alcohol/drug to achieve intoxication or desired effect.  11.) Withdrawal, as manifested by either of the following: The characteristic withdrawal syndrome for alcohol/drug (refer to Criteria A and B of the criteria set for alcohol/drug withdrawal). and Alcohol/drug (or a closely related substance, such as a benzodiazepine) is taken to relieve or avoid withdrawal symptoms.      Specify if: In early remission:  After full criteria for alcohol/drug use disorder were previously met, none of the criteria for alcohol/drug use disorder have been met for at least 3 months but for less than 12 months (with the exception that Criterion A4,  Craving or a strong desire or urge to use alcohol/drug  may be met).     In sustained remission:   After full criteria for alcohol use disorder were previously met, none of the criteria for alcohol/drug use disorder have been met at any time during a period of 12 months or longer (with the exception that Criterion A4,  Craving or strong desire or urge to use alcohol/drug  may be met).   Specify if:   This additional specifier is used if the individual is in an environment where access to alcohol is restricted.    Mild: Presence of 2-3  symptoms  Moderate: Presence of 4-5 symptoms  Severe: Presence of 6 or more symptoms

## 2020-03-30 ENCOUNTER — VIRTUAL VISIT (OUTPATIENT)
Dept: FAMILY MEDICINE | Facility: CLINIC | Age: 55
End: 2020-03-30
Payer: MEDICAID

## 2020-03-30 ENCOUNTER — TELEPHONE (OUTPATIENT)
Dept: FAMILY MEDICINE | Facility: CLINIC | Age: 55
End: 2020-03-30

## 2020-03-30 VITALS
TEMPERATURE: 98.3 F | RESPIRATION RATE: 16 BRPM | SYSTOLIC BLOOD PRESSURE: 139 MMHG | OXYGEN SATURATION: 94 % | HEIGHT: 70 IN | HEART RATE: 89 BPM | DIASTOLIC BLOOD PRESSURE: 87 MMHG

## 2020-03-30 DIAGNOSIS — E80.6 HYPERBILIRUBINEMIA: ICD-10-CM

## 2020-03-30 DIAGNOSIS — I10 ESSENTIAL HYPERTENSION: ICD-10-CM

## 2020-03-30 DIAGNOSIS — Z09 HOSPITAL DISCHARGE FOLLOW-UP: Primary | ICD-10-CM

## 2020-03-30 DIAGNOSIS — K70.9 LIVER DISEASE, CHRONIC, DUE TO ALCOHOL (H): ICD-10-CM

## 2020-03-30 DIAGNOSIS — F10.20 ALCOHOL USE DISORDER, MODERATE, DEPENDENCE (H): ICD-10-CM

## 2020-03-30 LAB — POTASSIUM SERPL-SCNC: 3.9 MMOL/L (ref 3.4–5.3)

## 2020-03-30 PROCEDURE — 36415 COLL VENOUS BLD VENIPUNCTURE: CPT | Performed by: HOSPITALIST

## 2020-03-30 PROCEDURE — 25000132 ZZH RX MED GY IP 250 OP 250 PS 637: Performed by: INTERNAL MEDICINE

## 2020-03-30 PROCEDURE — 84132 ASSAY OF SERUM POTASSIUM: CPT | Performed by: HOSPITALIST

## 2020-03-30 PROCEDURE — 99207 ZZC NO BILLABLE SERVICE THIS VISIT: CPT | Performed by: INTERNAL MEDICINE

## 2020-03-30 PROCEDURE — 99239 HOSP IP/OBS DSCHRG MGMT >30: CPT | Performed by: HOSPITALIST

## 2020-03-30 PROCEDURE — 25000132 ZZH RX MED GY IP 250 OP 250 PS 637: Performed by: HOSPITALIST

## 2020-03-30 RX ORDER — LORATADINE 10 MG/1
10 TABLET ORAL DAILY
Start: 2020-03-31

## 2020-03-30 RX ORDER — MULTIPLE VITAMINS W/ MINERALS TAB 9MG-400MCG
1 TAB ORAL DAILY
Qty: 30 TABLET | Refills: 0 | Status: SHIPPED | OUTPATIENT
Start: 2020-03-31

## 2020-03-30 RX ORDER — FOLIC ACID 1 MG/1
1 TABLET ORAL DAILY
Qty: 30 TABLET | Refills: 0 | Status: SHIPPED | OUTPATIENT
Start: 2020-03-31

## 2020-03-30 RX ADMIN — METOPROLOL TARTRATE 50 MG: 50 TABLET, FILM COATED ORAL at 08:32

## 2020-03-30 RX ADMIN — POTASSIUM CHLORIDE 20 MEQ: 1500 TABLET, EXTENDED RELEASE ORAL at 08:32

## 2020-03-30 RX ADMIN — LORATADINE 10 MG: 10 TABLET ORAL at 08:32

## 2020-03-30 RX ADMIN — MULTIPLE VITAMINS W/ MINERALS TAB 1 TABLET: TAB at 08:32

## 2020-03-30 RX ADMIN — FOLIC ACID 1 MG: 1 TABLET ORAL at 08:32

## 2020-03-30 RX ADMIN — ACETAMINOPHEN 650 MG: 325 TABLET, FILM COATED ORAL at 06:17

## 2020-03-30 ASSESSMENT — ACTIVITIES OF DAILY LIVING (ADL)
ADLS_ACUITY_SCORE: 14

## 2020-03-30 NOTE — TELEPHONE ENCOUNTER
Dr. Sanderson,    Would you be willing to speak with this pt as a hospital f/u? He has no PCP and has never been seen here. [facility] looking to schedule pt.    Thank you,  Davide MARR RN

## 2020-03-30 NOTE — CONSULTS
Rule 25 Assessment completed via telephone call.    Recommendation: Residential Treatment  Referrals: TBD. Pt asked for a list of treatment programs in the [place] area. Writer will email this to the pt this evening or tomorrow morning. Pt understands his assessment must be sent for referral within 7 days. He will email or call writer within the next few days, once he has received and reviewed the programs.   Writer will update chart with the same list of treatment programs send to pt.    Mounika Sky, [facility]  [phone]

## 2020-03-30 NOTE — TELEPHONE ENCOUNTER
Reason for Call:  Other appointment    Detailed comments:   Ashlee  From the [facility] is calling to book pt a virtual hospital follow up visit.  Pt was in the hospital delirium, tremors, and alcohol with draw.      Phone Number Patient can be reached at: Home number on file [phone] (home)    Best Time: Any     Can we leave a detailed message on this number? YES    Call taken on 3/30/2020 at 9:51 AM by Mckayla Grover

## 2020-03-30 NOTE — PLAN OF CARE
A&Ox4. VSS on RA. Denies pain. Independent in room. Tolerating regular diet. Continent of B/B. Tele SR with first degree AVB and BBB. PIV SL. CIWA 2 and 0. Discharge TBD, pending chem dep placement.

## 2020-03-30 NOTE — PLAN OF CARE
Discharge    Patient discharged to home via [facility]     Care plan note  A&Ox4. VSS on RA. Denies pain. Independent in room. Tolerating regular diet. Continent of B/B. CIWA 0. LS diminished. Denies SOB/ denies chest pain. Needs to follow up with PCP/ Chem dep.    Listed belongings gathered and returned to patient. Yes  Care Plan and Patient education resolved: Yes  Prescriptions if needed, hard copies sent with patient  NA  Home and hospital acquired medications returned to patient: NA  Medication Bin checked and emptied on discharge Yes  Follow up appointment made for patient: Yes

## 2020-03-30 NOTE — PROGRESS NOTES
"Leonel Ford is a 54 year old male who is being evaluated via a telephone visit.      The patient has been notified of following (by JOSE Roque CMA     This was a scheduled as a telephone visit instead of office visit due to coronavirus pandemic.      \"We have found that certain health care needs can be provided without the need for a physical exam.  This service lets us provide the care you need with a short phone conversation.  If a prescription is necessary we can send it directly to your pharmacy.  If lab work is needed we can place an order for that and you can then stop by our lab to have the test done at a later time.    This telephone visit will be conducted via 3 way call with the you (the patient) , the physician/provider, and a me all on the line at the same time.  This allows your physician/provider to have the phone conversation with you while I will be taking notes for your medical record.  We will have full access to your [facility] medical record during this entire phone call.    Since this is like an office visit,  will bill your insurance company for this service.  Please check with your medical insurance if this type of telephone/virtual is covered . You may be responsible for the cost of this service if insurance coverage is denied.  The typical cost is $30 (10min), $59(11-20min) and $85 (21-30min)     If during the course of the call the physician/provider feels a telephone visit is not appropriate, you will not be charged for this service\"    Consent has been obtained for this service by care team member: yes.  See the scanned image in the medical record.    S: The history as provided by the patient to the provider during this 3 way call include telephone encounter for hospital follow-up, he has history of chronic alcohol use disorder with secondary cirrhosis/alcohol liver disease, chemical dependency consult done during his hospital he had 6 ED hospital admissions for alcohol " intoxication/withdrawals..  Namita working with him from chemical dependency to get him into inpatient Treatment center, he would like to that [facility] where his family resides.  Other complications during hospitalization including thrombocytopenia and hypokalemia [corrected].  Patient has not seen GI or had an EGD in the past.  Denies any history of hemoptysis, epistaxis or blood in the stools.  He denies any lethargy, any dizziness, lightheadedness, abdominal pain, edema or other systemic complaints.  He has history of hypertension, he is maintained on metoprolol 50 mg twice daily as well as thiamine, multivitamins.  No family history of heart disease or strokes.  Patient denies any chest pain, shortness of breath or dyspnea.  He has a history in the past of dislocated shoulder but no other significant medical history other than stated.  He has not had any regular medical care.    Pertinent parts of the the patient's medical history reviewed and confirmed by the provider included : History of alcohol use disorder, alcohol liver disease/cirrhosis, hepatosplenomegaly, thrombocytopenia, hypertension    Total time of call between patient and provider was 18 minutes     Arabella Sanderson (MD signature)  ===================================================    I have reviewed the note as documented above.  This accurately captures the substance of my conversation with the patient,    Additional provider notes: None    Assessment/Plan:  Alcohol use disorder  Cirrhosis/alcohol liver disease [LFTs are normal]  Hepatosplenomegaly  Thrombocytopenia  -Probably secondary to above  Hypokalemia- corrected  Hypertension  -On metoprolol 50 mg twice daily  -Hypo-albumin anemia  Mild hyperbilirubinemia  Plan patient will go into inpatient rehab for alcohol treatment and detox.  Advised patient to see GI will need an EGD as well and further evaluation of his alcohol liver disease. Patient  states that is not his priority for now;  reports he wants to get inpatient treatment for now for alcoholism; as he had relapsed 6 times as outpatient, he is not able to do the detox as outpatient.  Continue metoprolol, patient will need to call us with blood pressure readings, will need further cardiovascular risk stratification including lipid panel. Advised need to establish care with a provider  for further treatment of chronic medical problems when he finishes his inpatient therapy. He declined GI referral at this time.

## 2020-03-30 NOTE — DISCHARGE SUMMARY
[facility]    Discharge Summary  Hospitalist    Date of Admission:  3/24/2020  Date of Discharge:  3/30/2020  Discharging Provider: Sharon Willis DO  Date of Service (when I saw the patient): 03/30/20    Discharge Diagnoses   Delirium Tremens with hallucinations   Alcohol Withdrawal  Alcohol use disorder, severe with dependence  Chronic thrombocytopenia  Liver cirrhosis   Hepatosplenomegaly  Hypertension  Hypokalemia    History of Present Illness   Leonel Ford is an 54 year old male who presented with delirium tremens and hallucinations associated with alcohol withdrawal.     Hospital Course   Leonel Ford was admitted on 3/24/2020.  The following problems were addressed during his hospitalization:    Delirium Tremens with hallucinations   Alcohol Withdrawal  Alcohol use disorder, severe with dependence  6th known ED/hospital presentation for alcohol intoxication or withdrawal within 4 months. He denies ever having alcohol treatment program enrollment in the past. He is willing to consider it now and is hopeful for inpatient/residential treatment program since he doesn't believe he will succeed in outpatient program. Hallucinations resolved on 3/28, last diazepam needed on 3/26.  - Continue folate, MVI on discharge, 30 day prescription provided  - CD consultation appreciated, she will email him a list of residential treatment facilities near [facility] (where he is planning to relocate to as family is there)  - Alcohol cessation encouraged.     Chronic thrombocytopenia  Liver cirrhosis   Hepatosplenomegaly  Secondary to longstanding alcohol abuse.  - Support abstinence or reduction in use as noted above.     Hypertension  PTA metoprolol 50mg BID.  - Continue PTA metoprolol 50mg BID, blood pressures improved once withdrawal resolved    Hypokalemia, resolved  -Repleted during admission, likely secondary to poor nutrition in setting of alcohol use disorder, was stable in upper 3s. No need for  supplementation on discharge    Sharon Willis DO    Significant Results and Procedures   See below    Pending Results   NA    Code Status   Full Code       Primary Care Physician   Physician No Ref-Primary    Physical Exam   Temp: 98.3  F (36.8  C) Temp src: Oral BP: 139/87 Pulse: 89 Heart Rate: 87 Resp: 16 SpO2: 94 % O2 Device: None (Room air)    There were no vitals filed for this visit.  Vital Signs with Ranges  Temp:  [97.7  F (36.5  C)-98.3  F (36.8  C)] 98.3  F (36.8  C)  Pulse:  [85-89] 89  Heart Rate:  [69-87] 87  Resp:  [16] 16  BP: (139-143)/(86-90) 139/87  SpO2:  [94 %-96 %] 94 %  I/O last 3 completed shifts:  In: 600 [P.O.:600]  Out: -     Constitutional: Awake, alert, cooperative, no apparent distress.  Eyes: Conjunctiva and pupils examined and normal.  HEENT: Moist mucous membranes, normal dentition.  Respiratory: Clear to auscultation bilaterally, no crackles or wheezing.  Cardiovascular: Regular rate and rhythm, normal S1 and S2, and no murmur noted.  GI: Soft, non-distended, non-tender, normal bowel sounds.  Lymph/Hematologic: No anterior cervical or supraclavicular adenopathy.  Skin: No rashes, no cyanosis, no edema.  Musculoskeletal: No joint swelling, erythema or tenderness.  Neurologic: Cranial nerves 2-12 intact, normal strength and sensation. Tremors resolved  Psychiatric: Alert, oriented to person, place and time, no obvious anxiety or depression.    Discharge Disposition   Discharged to home  Condition at discharge: Stable    Consultations This Hospital Stay   CARE TRANSITION RN/SW IP CONSULT  CHEMICAL DEPENDENCY IP CONSULT  CHEMICAL DEPENDENCY IP CONSULT    Time Spent on this Encounter   ISharon DO, personally saw the patient today and spent greater than 30 minutes discharging this patient.    Discharge Orders      Reason for your hospital stay    Alcohol withdrawal     Follow-up and recommended labs and tests     Establish care with primary care physician within 30 days (as  able in [facility]).    Follow up with Mounika Sky (chemical dependency counselor) [phone] within 7 days for help finding residential treatment center. She will send you a list of facilities via email.     Activity    Your activity upon discharge: activity as tolerated     Full Code     Diet    Follow this diet upon discharge: Regular Diet Adult     Discharge Medications   Current Discharge Medication List      START taking these medications    Details   folic acid (FOLVITE) 1 MG tablet Take 1 tablet (1 mg) by mouth daily  Qty: 30 tablet, Refills: 0    Comments: Future refills by PCP  Associated Diagnoses: Alcohol withdrawal syndrome without complication (H)      loratadine (CLARITIN) 10 MG tablet Take 1 tablet (10 mg) by mouth daily  Qty:      Associated Diagnoses: Seasonal allergic rhinitis, unspecified trigger      multivitamin w/minerals (THERA-VIT-M) tablet Take 1 tablet by mouth daily  Qty: 30 tablet, Refills: 0    Comments: Future refills by PCP  Associated Diagnoses: Alcohol withdrawal syndrome without complication (H)         CONTINUE these medications which have NOT CHANGED    Details   acetaminophen (TYLENOL) 500 MG tablet Take 1,000 mg by mouth every 4 hours as needed for mild pain      metoprolol tartrate (LOPRESSOR) 50 MG tablet Take 50 mg by mouth 2 times daily      tetrahydrozoline (VISINE) 0.05 % ophthalmic solution Place 1 drop into both eyes 2 times daily as needed           Allergies   Allergies   Allergen Reactions     Penicillins      Data   Most Recent 3 CBC's:  Recent Labs   Lab Test 03/27/20  0624 03/24/20  2347   WBC 5.8 6.9   HGB 15.8 17.2   MCV 97 93   PLT 74* 102*      Most Recent 3 BMP's:  Recent Labs   Lab Test 03/30/20  0612 03/29/20  0608 03/28/20  0558 03/27/20  0624  03/26/20  0615  03/24/20  2347   NA  --   --   --  134  --  134  --  134   POTASSIUM 3.9 3.9 3.6 3.8   < > 3.3*   < > 3.4   CHLORIDE  --   --   --  103  --  105  --  104   CO2  --   --   --  26  --  21  --  21    BUN  --   --   --  10  --  10  --  10   CR  --   --   --  0.91  --  0.82  --  0.90   ANIONGAP  --   --   --  5  --  8  --  9   ROXY  --   --   --  8.6  --  8.3*  --  9.2   GLC  --   --   --  83  --  91  --  144*    < > = values in this interval not displayed.     Most Recent 2 LFT's:  Recent Labs   Lab Test 03/27/20  0624   AST 37   ALT 28   ALKPHOS 70   BILITOTAL 1.5*     Most Recent INR's and Anticoagulation Dosing History:  Anticoagulation Dose History     There is no flowsheet data to display.        Most Recent 3 Troponin's:No lab results found.  Most Recent Cholesterol Panel:No lab results found.  Most Recent 6 Bacteria Isolates From Any Culture (See EPIC Reports for Culture Details):No lab results found.  Most Recent TSH, T4 and A1c Labs:No lab results found.  No results found for this or any previous visit.

## 2020-04-01 PROBLEM — I10 ESSENTIAL HYPERTENSION: Status: ACTIVE | Noted: 2020-04-01

## 2024-02-06 NOTE — PROGRESS NOTES
Care Coordination:    Follow for discharge planning.  Pt is being discharge to home.  Per notes plan to follow up with CD recommendations and is looking for a treatment facility in [facility].    Pt does not have a PCP.  He would like a follow up arranged by CC and anticipates being in the [facility] for the next 1-2 weeks while arrangements are made.   Virtual visit arranged at [facility].  They will call him with appointment information.  Phone number verified and on face sheet.   Pt voices no other needs for discharge     Ashlee Fair RN BSN  Inpatient Care Coordination  [facility]     Detail Level: Generalized Detail Level: Detailed Prescription Strength Graduated Compression Stockings Recommendations: The patient was counseled that prescription strength graduated compression stockings should be worn for all waking hours. They will follow up with a venous specialist to monitor graduated compression stocking usage and their symptoms. Antihistamine Recommendations: Zyrtec 10 mg x 1 per day at night as needed for itch